# Patient Record
Sex: MALE | Race: WHITE | NOT HISPANIC OR LATINO | Employment: FULL TIME | URBAN - METROPOLITAN AREA
[De-identification: names, ages, dates, MRNs, and addresses within clinical notes are randomized per-mention and may not be internally consistent; named-entity substitution may affect disease eponyms.]

---

## 2023-06-26 ENCOUNTER — OFFICE VISIT (OUTPATIENT)
Dept: PRIMARY CARE | Facility: CLINIC | Age: 53
End: 2023-06-26
Payer: COMMERCIAL

## 2023-06-26 VITALS
DIASTOLIC BLOOD PRESSURE: 78 MMHG | RESPIRATION RATE: 18 BRPM | HEART RATE: 110 BPM | HEIGHT: 70 IN | SYSTOLIC BLOOD PRESSURE: 140 MMHG | OXYGEN SATURATION: 98 % | BODY MASS INDEX: 34.27 KG/M2 | WEIGHT: 239.4 LBS

## 2023-06-26 DIAGNOSIS — Z00.00 HEALTHCARE MAINTENANCE: Primary | ICD-10-CM

## 2023-06-26 DIAGNOSIS — Z12.5 SCREENING FOR PROSTATE CANCER: ICD-10-CM

## 2023-06-26 DIAGNOSIS — J33.9 NASAL POLYPS: ICD-10-CM

## 2023-06-26 DIAGNOSIS — Z13.6 SCREENING FOR CARDIOVASCULAR CONDITION: ICD-10-CM

## 2023-06-26 DIAGNOSIS — J45.20 MILD INTERMITTENT REACTIVE AIRWAY DISEASE WITHOUT COMPLICATION (HHS-HCC): ICD-10-CM

## 2023-06-26 PROBLEM — R73.9 ELEVATED BLOOD SUGAR: Status: RESOLVED | Noted: 2023-06-26 | Resolved: 2023-06-26

## 2023-06-26 PROBLEM — N52.9 MALE ERECTILE DISORDER: Status: RESOLVED | Noted: 2023-06-26 | Resolved: 2023-06-26

## 2023-06-26 PROBLEM — H10.45 CHRONIC ALLERGIC CONJUNCTIVITIS: Status: RESOLVED | Noted: 2023-06-26 | Resolved: 2023-06-26

## 2023-06-26 PROBLEM — N52.9 MALE ERECTILE DISORDER: Status: ACTIVE | Noted: 2023-06-26

## 2023-06-26 PROBLEM — L03.115 CELLULITIS OF RIGHT LOWER EXTREMITY: Status: RESOLVED | Noted: 2023-06-26 | Resolved: 2023-06-26

## 2023-06-26 PROBLEM — J45.909 REACTIVE AIRWAY DISEASE (HHS-HCC): Status: ACTIVE | Noted: 2023-06-26

## 2023-06-26 PROBLEM — M77.10 TENNIS ELBOW: Status: RESOLVED | Noted: 2023-06-26 | Resolved: 2023-06-26

## 2023-06-26 PROBLEM — H91.90 DIMINISHED HEARING: Status: RESOLVED | Noted: 2023-06-26 | Resolved: 2023-06-26

## 2023-06-26 PROBLEM — M75.51 BURSITIS OF RIGHT SHOULDER: Status: RESOLVED | Noted: 2023-06-26 | Resolved: 2023-06-26

## 2023-06-26 PROBLEM — R06.2 WHEEZING: Status: RESOLVED | Noted: 2023-06-26 | Resolved: 2023-06-26

## 2023-06-26 PROBLEM — N35.919 STRICTURE OF MALE URETHRA: Status: RESOLVED | Noted: 2023-06-26 | Resolved: 2023-06-26

## 2023-06-26 PROBLEM — E55.9 VITAMIN D INSUFFICIENCY: Status: RESOLVED | Noted: 2023-06-26 | Resolved: 2023-06-26

## 2023-06-26 PROBLEM — L98.9 SKIN LESION OF BACK: Status: RESOLVED | Noted: 2023-06-26 | Resolved: 2023-06-26

## 2023-06-26 PROBLEM — R39.9 UTI SYMPTOMS: Status: RESOLVED | Noted: 2023-06-26 | Resolved: 2023-06-26

## 2023-06-26 PROBLEM — J06.9 URI, ACUTE: Status: RESOLVED | Noted: 2023-06-26 | Resolved: 2023-06-26

## 2023-06-26 PROBLEM — L25.5 DERMATITIS DUE TO PLANTS, INCLUDING POISON IVY, SUMAC, AND OAK: Status: RESOLVED | Noted: 2023-06-26 | Resolved: 2023-06-26

## 2023-06-26 PROBLEM — L84 CORN OF FOOT: Status: RESOLVED | Noted: 2023-06-26 | Resolved: 2023-06-26

## 2023-06-26 PROBLEM — K64.9 HEMORRHOIDS: Status: RESOLVED | Noted: 2023-06-26 | Resolved: 2023-06-26

## 2023-06-26 PROBLEM — J30.9 ALLERGIC RHINITIS: Status: ACTIVE | Noted: 2023-06-26

## 2023-06-26 PROCEDURE — 99396 PREV VISIT EST AGE 40-64: CPT | Performed by: NURSE PRACTITIONER

## 2023-06-26 RX ORDER — BUDESONIDE AND FORMOTEROL FUMARATE DIHYDRATE 80; 4.5 UG/1; UG/1
2 AEROSOL RESPIRATORY (INHALATION) 2 TIMES DAILY
Qty: 10.2 G | Refills: 11 | Status: SHIPPED | OUTPATIENT
Start: 2023-06-26 | End: 2023-06-29 | Stop reason: SDUPTHER

## 2023-06-26 RX ORDER — AZELASTINE 1 MG/ML
1 SPRAY, METERED NASAL 2 TIMES DAILY
Qty: 30 ML | Refills: 12 | Status: SHIPPED | OUTPATIENT
Start: 2023-06-26 | End: 2023-06-29 | Stop reason: SDUPTHER

## 2023-06-26 RX ORDER — ALBUTEROL SULFATE 90 UG/1
2 AEROSOL, METERED RESPIRATORY (INHALATION) EVERY 4 HOURS PRN
COMMUNITY
Start: 2014-08-21 | End: 2023-06-26 | Stop reason: SDUPTHER

## 2023-06-26 RX ORDER — BUDESONIDE AND FORMOTEROL FUMARATE DIHYDRATE 80; 4.5 UG/1; UG/1
2 AEROSOL RESPIRATORY (INHALATION) 2 TIMES DAILY
COMMUNITY
Start: 2014-12-23 | End: 2023-06-26 | Stop reason: SDUPTHER

## 2023-06-26 RX ORDER — ALBUTEROL SULFATE 90 UG/1
2 AEROSOL, METERED RESPIRATORY (INHALATION) EVERY 4 HOURS PRN
Qty: 18 G | Refills: 3 | Status: SHIPPED | OUTPATIENT
Start: 2023-06-26 | End: 2023-06-29 | Stop reason: SDUPTHER

## 2023-06-26 NOTE — PATIENT INSTRUCTIONS
Have fasting lab work done   Follow up with ENT as you do have polyps in your nose  I sent in a special nasal spray to try.    I sent in your asthma meds  Colonoscopy is not due until 2029    Follow up in 3 months for a blood pressure check

## 2023-06-26 NOTE — PROGRESS NOTES
"Subjective   Patient ID: Son Rouse is a 52 y.o. male who presents for Annual Exam (NP.OV. here today to [facility], denies CPE within the last year. He would like to discus nasal congestion for the past 6 months and he would also like to discuss prescription refills. ).    Well Adult Physical   Patient here for a comprehensive physical exam.The patient reports no problems    Do you take any herbs or supplements that were not prescribed by a doctor? no   Are you taking calcium supplements? no   Are you taking aspirin daily? no    Last PCP left so needed new PCP  Needs refills  Asthma - stable with Symbicort.  Uses rescue inhaler less than 1 time per month     Has had nasal congestion 6 months - has tried many OTC medications, nasal spray and neti pot - nothing has helped   He is     Colonoscopy 12/5/19  Due for lab work  He does chew tobacco    He is a manager and  for an engineering            Review of Systems   All other systems reviewed and are negative.      Objective   /78   Pulse 110   Resp 18   Ht 1.778 m (5' 10\")   Wt 109 kg (239 lb 6.4 oz)   SpO2 98%   BMI 34.35 kg/m²     Physical Exam  Vitals and nursing note reviewed.   Constitutional:       General: He is not in acute distress.     Appearance: Normal appearance.   HENT:      Head: Normocephalic and atraumatic.      Right Ear: Tympanic membrane, ear canal and external ear normal.      Left Ear: Tympanic membrane, ear canal and external ear normal.      Nose: Nose normal.      Right Nostril: Occlusion present.      Left Nostril: Occlusion present.      Right Turbinates: Enlarged.      Left Turbinates: Enlarged.      Mouth/Throat:      Mouth: Mucous membranes are moist.      Pharynx: Oropharynx is clear.   Eyes:      Conjunctiva/sclera: Conjunctivae normal.      Pupils: Pupils are equal, round, and reactive to light.   Neck:      Vascular: No carotid bruit.   Cardiovascular:      Rate and Rhythm: Normal rate and regular " rhythm.      Pulses: Normal pulses.      Heart sounds: Normal heart sounds.   Pulmonary:      Effort: Pulmonary effort is normal.      Breath sounds: Normal breath sounds.   Abdominal:      General: Bowel sounds are normal.      Palpations: Abdomen is soft.   Musculoskeletal:         General: Normal range of motion.      Cervical back: Normal range of motion.      Right lower leg: No edema.      Left lower leg: No edema.   Lymphadenopathy:      Cervical: No cervical adenopathy.   Skin:     General: Skin is warm and dry.      Capillary Refill: Capillary refill takes less than 2 seconds.   Neurological:      General: No focal deficit present.      Mental Status: He is alert and oriented to person, place, and time. Mental status is at baseline.   Psychiatric:         Mood and Affect: Mood normal.         Thought Content: Thought content normal.         Assessment/Plan   Problem List Items Addressed This Visit       Reactive airway disease     Stable  Refilled albuterol  Continue Symbicort         Nasal polyps     Large nasal polyps seen on physical exam  ENT referral  Start astelin         Relevant Orders    Referral to ENT    Healthcare maintenance - Primary     - CBC, CMP, Lipid  -  up to date with immunizations  -  screening colonoscopy 2019  -  eat a diet high in lean protein, vegetable, fruits, and low in carbohydrates  -  exercise 20-30 minutes 4-5 days a week  -  Follow up in 1 year         Relevant Orders    CBC    Comprehensive Metabolic Panel     Other Visit Diagnoses       Screening for cardiovascular condition        Relevant Orders    Lipid Panel    Screening for prostate cancer        Relevant Orders    Prostate Specific Antigen, Screen

## 2023-06-29 DIAGNOSIS — J33.9 NASAL POLYPS: ICD-10-CM

## 2023-06-29 DIAGNOSIS — J45.20 MILD INTERMITTENT REACTIVE AIRWAY DISEASE WITHOUT COMPLICATION (HHS-HCC): ICD-10-CM

## 2023-06-29 RX ORDER — AZELASTINE 1 MG/ML
1 SPRAY, METERED NASAL 2 TIMES DAILY
Qty: 30 ML | Refills: 12 | Status: SHIPPED | OUTPATIENT
Start: 2023-06-29 | End: 2023-10-25 | Stop reason: ALTCHOICE

## 2023-06-29 RX ORDER — ALBUTEROL SULFATE 90 UG/1
2 AEROSOL, METERED RESPIRATORY (INHALATION) EVERY 4 HOURS PRN
Qty: 18 G | Refills: 3 | Status: SHIPPED | OUTPATIENT
Start: 2023-06-29

## 2023-06-29 RX ORDER — BUDESONIDE AND FORMOTEROL FUMARATE DIHYDRATE 80; 4.5 UG/1; UG/1
2 AEROSOL RESPIRATORY (INHALATION) 2 TIMES DAILY
Qty: 10.2 G | Refills: 11 | Status: SHIPPED | OUTPATIENT
Start: 2023-06-29 | End: 2024-06-28

## 2023-07-05 PROBLEM — J33.9 NASAL POLYPS: Status: ACTIVE | Noted: 2023-07-05

## 2023-07-05 PROBLEM — Z00.00 HEALTHCARE MAINTENANCE: Status: ACTIVE | Noted: 2023-07-05

## 2023-07-05 NOTE — ASSESSMENT & PLAN NOTE
- CBC, CMP, Lipid  -  up to date with immunizations  -  screening colonoscopy 2019  -  eat a diet high in lean protein, vegetable, fruits, and low in carbohydrates  -  exercise 20-30 minutes 4-5 days a week  -  Follow up in 1 year

## 2023-08-14 ENCOUNTER — OFFICE VISIT (OUTPATIENT)
Dept: PRIMARY CARE | Facility: CLINIC | Age: 53
End: 2023-08-14
Payer: COMMERCIAL

## 2023-08-14 ENCOUNTER — LAB (OUTPATIENT)
Dept: LAB | Facility: LAB | Age: 53
End: 2023-08-14
Payer: COMMERCIAL

## 2023-08-14 VITALS
SYSTOLIC BLOOD PRESSURE: 136 MMHG | OXYGEN SATURATION: 99 % | RESPIRATION RATE: 18 BRPM | BODY MASS INDEX: 34.04 KG/M2 | DIASTOLIC BLOOD PRESSURE: 74 MMHG | HEIGHT: 70 IN | HEART RATE: 70 BPM | WEIGHT: 237.8 LBS

## 2023-08-14 DIAGNOSIS — R21 RASH: ICD-10-CM

## 2023-08-14 DIAGNOSIS — R21 RASH: Primary | ICD-10-CM

## 2023-08-14 DIAGNOSIS — Z13.6 SCREENING FOR CARDIOVASCULAR CONDITION: ICD-10-CM

## 2023-08-14 DIAGNOSIS — Z00.00 HEALTHCARE MAINTENANCE: ICD-10-CM

## 2023-08-14 DIAGNOSIS — Z12.5 SCREENING FOR PROSTATE CANCER: ICD-10-CM

## 2023-08-14 LAB
ALANINE AMINOTRANSFERASE (SGPT) (U/L) IN SER/PLAS: 35 U/L (ref 10–52)
ALBUMIN (G/DL) IN SER/PLAS: 4.6 G/DL (ref 3.4–5)
ALKALINE PHOSPHATASE (U/L) IN SER/PLAS: 80 U/L (ref 33–120)
ANION GAP IN SER/PLAS: 15 MMOL/L (ref 10–20)
ASPARTATE AMINOTRANSFERASE (SGOT) (U/L) IN SER/PLAS: 24 U/L (ref 9–39)
BILIRUBIN TOTAL (MG/DL) IN SER/PLAS: 0.6 MG/DL (ref 0–1.2)
CALCIUM (MG/DL) IN SER/PLAS: 10 MG/DL (ref 8.6–10.3)
CARBON DIOXIDE, TOTAL (MMOL/L) IN SER/PLAS: 30 MMOL/L (ref 21–32)
CHLORIDE (MMOL/L) IN SER/PLAS: 101 MMOL/L (ref 98–107)
CHOLESTEROL (MG/DL) IN SER/PLAS: 211 MG/DL (ref 0–199)
CHOLESTEROL IN HDL (MG/DL) IN SER/PLAS: 63.5 MG/DL
CHOLESTEROL/HDL RATIO: 3.3
CREATININE (MG/DL) IN SER/PLAS: 0.87 MG/DL (ref 0.5–1.3)
ERYTHROCYTE DISTRIBUTION WIDTH (RATIO) BY AUTOMATED COUNT: 13.2 % (ref 11.5–14.5)
ERYTHROCYTE MEAN CORPUSCULAR HEMOGLOBIN CONCENTRATION (G/DL) BY AUTOMATED: 33.7 G/DL (ref 32–36)
ERYTHROCYTE MEAN CORPUSCULAR VOLUME (FL) BY AUTOMATED COUNT: 91 FL (ref 80–100)
ERYTHROCYTES (10*6/UL) IN BLOOD BY AUTOMATED COUNT: 5.23 X10E12/L (ref 4.5–5.9)
GFR MALE: >90 ML/MIN/1.73M2
GLUCOSE (MG/DL) IN SER/PLAS: 101 MG/DL (ref 74–99)
HEMATOCRIT (%) IN BLOOD BY AUTOMATED COUNT: 47.5 % (ref 41–52)
HEMOGLOBIN (G/DL) IN BLOOD: 16 G/DL (ref 13.5–17.5)
LDL: 133 MG/DL (ref 0–99)
LEUKOCYTES (10*3/UL) IN BLOOD BY AUTOMATED COUNT: 6.4 X10E9/L (ref 4.4–11.3)
PLATELETS (10*3/UL) IN BLOOD AUTOMATED COUNT: 338 X10E9/L (ref 150–450)
POTASSIUM (MMOL/L) IN SER/PLAS: 4.3 MMOL/L (ref 3.5–5.3)
PROSTATE SPECIFIC ANTIGEN,SCREEN: 0.39 NG/ML (ref 0–4)
PROTEIN TOTAL: 7.9 G/DL (ref 6.4–8.2)
SODIUM (MMOL/L) IN SER/PLAS: 142 MMOL/L (ref 136–145)
TRIGLYCERIDE (MG/DL) IN SER/PLAS: 74 MG/DL (ref 0–149)
UREA NITROGEN (MG/DL) IN SER/PLAS: 11 MG/DL (ref 6–23)
VLDL: 15 MG/DL (ref 0–40)

## 2023-08-14 PROCEDURE — 99213 OFFICE O/P EST LOW 20 MIN: CPT | Performed by: STUDENT IN AN ORGANIZED HEALTH CARE EDUCATION/TRAINING PROGRAM

## 2023-08-14 PROCEDURE — 36415 COLL VENOUS BLD VENIPUNCTURE: CPT

## 2023-08-14 PROCEDURE — 84153 ASSAY OF PSA TOTAL: CPT

## 2023-08-14 PROCEDURE — 85027 COMPLETE CBC AUTOMATED: CPT

## 2023-08-14 PROCEDURE — 80053 COMPREHEN METABOLIC PANEL: CPT

## 2023-08-14 PROCEDURE — 87476 LYME DIS DNA AMP PROBE: CPT

## 2023-08-14 PROCEDURE — 80061 LIPID PANEL: CPT

## 2023-08-14 RX ORDER — DOXYCYCLINE HYCLATE 100 MG
TABLET ORAL
COMMUNITY
Start: 2023-08-11 | End: 2023-10-25 | Stop reason: ALTCHOICE

## 2023-08-14 RX ORDER — MULTIVITAMIN
1 TABLET ORAL DAILY
COMMUNITY
End: 2023-10-25 | Stop reason: ALTCHOICE

## 2023-08-14 ASSESSMENT — PATIENT HEALTH QUESTIONNAIRE - PHQ9
1. LITTLE INTEREST OR PLEASURE IN DOING THINGS: NOT AT ALL
2. FEELING DOWN, DEPRESSED OR HOPELESS: NOT AT ALL
SUM OF ALL RESPONSES TO PHQ9 QUESTIONS 1 AND 2: 0

## 2023-08-14 NOTE — PROGRESS NOTES
Subjective   Patient ID: Son Rouse is a 52 y.o. male who presents for Rash (Pt is here for a rash on the  top right shoulder for the last several weeks. Pt has been to  twice, including this past Friday. Pt was given topical for ring work and second visit was given doxycycline for lyme disease.).    Patient presents today for evaluation of his rash.  Patient states that he initially had a rash for couple weeks but it was getting worse.  It started as a single induration on his mid upper back.  It began to get hot and warm and spread erythema throughout the top upper back and shoulders.  He presented to the urgent care and was given a cream to put on it.  This helped initially but then worsened course.  He presented again to the urgent care on the 8/11/23.  He was given a course of doxycycline for 15 days.  Today he feels that the itching and warmth has subsided but is unable to see his back this morning as far as the erythema.  There was some initial concern for possible tick bite due to the initial rash.  Patient will like to be tested today for Lyme.     Rash        Review of Systems   Skin:  Positive for rash.       Objective   Physical Exam  Vitals reviewed.   Skin:            Comments: Mild erythema no warmth or induration         Assessment/Plan   Diagnoses and all orders for this visit:  Rash  Comments:  Improving based off of patient history  Continue doxycycline course  We will order Lyme  Return to care if symptoms worsen  Orders:  -     Lyme Disease (Borrelia burgdorferi), PCR; Future

## 2023-08-19 LAB — LYME DISEASE (BORRELIA BURGDORFERI), PCR: NOT DETECTED

## 2023-09-26 ENCOUNTER — OFFICE VISIT (OUTPATIENT)
Dept: PRIMARY CARE | Facility: CLINIC | Age: 53
End: 2023-09-26
Payer: COMMERCIAL

## 2023-09-26 VITALS
OXYGEN SATURATION: 98 % | HEART RATE: 95 BPM | RESPIRATION RATE: 18 BRPM | SYSTOLIC BLOOD PRESSURE: 142 MMHG | WEIGHT: 242.4 LBS | BODY MASS INDEX: 34.7 KG/M2 | DIASTOLIC BLOOD PRESSURE: 90 MMHG | HEIGHT: 70 IN

## 2023-09-26 DIAGNOSIS — R03.0 ELEVATED BP WITHOUT DIAGNOSIS OF HYPERTENSION: Primary | ICD-10-CM

## 2023-09-26 PROCEDURE — 99213 OFFICE O/P EST LOW 20 MIN: CPT | Performed by: NURSE PRACTITIONER

## 2023-09-26 RX ORDER — PREDNISONE 10 MG/1
10 TABLET ORAL DAILY
COMMUNITY
Start: 2023-09-20 | End: 2023-10-25 | Stop reason: ALTCHOICE

## 2023-09-26 RX ORDER — FLUTICASONE PROPIONATE 50 MCG
2 SPRAY, SUSPENSION (ML) NASAL DAILY
COMMUNITY
Start: 2023-09-20

## 2023-10-24 PROBLEM — L91.8 OTHER HYPERTROPHIC DISORDERS OF THE SKIN: Status: ACTIVE | Noted: 2019-05-24

## 2023-10-24 PROBLEM — D18.01 HEMANGIOMA OF SKIN AND SUBCUTANEOUS TISSUE: Status: ACTIVE | Noted: 2019-05-24

## 2023-10-24 PROBLEM — J32.9 CHRONIC SINUSITIS: Status: ACTIVE | Noted: 2023-10-24

## 2023-10-24 PROBLEM — D22.60 MELANOCYTIC NEVI OF UNSPECIFIED UPPER LIMB, INCLUDING SHOULDER: Status: ACTIVE | Noted: 2019-05-24

## 2023-10-24 PROBLEM — L81.4 OTHER MELANIN HYPERPIGMENTATION: Status: ACTIVE | Noted: 2019-05-24

## 2023-10-24 PROBLEM — L82.1 OTHER SEBORRHEIC KERATOSIS: Status: ACTIVE | Noted: 2019-05-24

## 2023-10-24 PROBLEM — D23.9 OTHER BENIGN NEOPLASM OF SKIN, UNSPECIFIED: Status: ACTIVE | Noted: 2019-05-24

## 2023-10-24 PROBLEM — D22.5 MELANOCYTIC NEVI OF TRUNK: Status: ACTIVE | Noted: 2019-05-24

## 2023-10-24 RX ORDER — BUDESONIDE AND FORMOTEROL FUMARATE DIHYDRATE 160; 4.5 UG/1; UG/1
2 AEROSOL RESPIRATORY (INHALATION) 2 TIMES DAILY
COMMUNITY
End: 2024-01-03 | Stop reason: WASHOUT

## 2023-10-25 ENCOUNTER — OFFICE VISIT (OUTPATIENT)
Dept: OTOLARYNGOLOGY | Facility: CLINIC | Age: 53
End: 2023-10-25
Payer: COMMERCIAL

## 2023-10-25 VITALS — WEIGHT: 243 LBS | TEMPERATURE: 97.5 F | HEIGHT: 69 IN | BODY MASS INDEX: 35.99 KG/M2

## 2023-10-25 DIAGNOSIS — J32.9 CHRONIC SINUSITIS, UNSPECIFIED LOCATION: ICD-10-CM

## 2023-10-25 DIAGNOSIS — J33.9 NASAL POLYP: Primary | ICD-10-CM

## 2023-10-25 PROCEDURE — 99213 OFFICE O/P EST LOW 20 MIN: CPT | Performed by: OTOLARYNGOLOGY

## 2023-10-25 NOTE — PROGRESS NOTES
"HPI  Son Rouse is a 53 y.o. male follow-up on medical treatment of chronic sinusitis with bilateral obstructive nasal polyps.  The prednisone was quite beneficial and he is breathing through his nose bilaterally now.  No facial pain      Past Medical History:   Diagnosis Date    Bursitis of right shoulder 06/26/2023    Cellulitis of right lower extremity 06/26/2023    Chronic allergic conjunctivitis 06/26/2023    Corn of foot 06/26/2023    Elevated blood sugar 06/26/2023    Encounter for immunization 12/23/2014    Need for pneumococcal vaccination    Hemorrhoids 06/26/2023    Impacted cerumen, left ear 08/21/2014    Impacted cerumen of left ear    Male erectile disorder 06/26/2023    Skin lesion of back 06/26/2023    Stricture of male urethra 06/26/2023    URI, acute 06/26/2023    UTI symptoms 06/26/2023    Vitamin D insufficiency 06/26/2023    Wheezing 06/26/2023            Medications:     Current Outpatient Medications:     albuterol (ProAir HFA) 90 mcg/actuation inhaler, Inhale 2 puffs every 4 hours if needed for shortness of breath or wheezing., Disp: 18 g, Rfl: 3    budesonide-formoteroL (Symbicort) 160-4.5 mcg/actuation inhaler, Inhale 2 puffs 2 times a day. Rinse mouth with water after use to reduce aftertaste and incidence of candidiasis. Do not swallow., Disp: , Rfl:     budesonide-formoteroL (Symbicort) 80-4.5 mcg/actuation inhaler, Inhale 2 puffs 2 times a day., Disp: 10.2 g, Rfl: 11    cholecalciferol, vitamin D3, (VITAMIN D3 ORAL), Take by mouth., Disp: , Rfl:     fluticasone (Flonase) 50 mcg/actuation nasal spray, Administer 2 sprays into each nostril once daily., Disp: , Rfl:      Allergies:  Allergies   Allergen Reactions    Nsaids (Non-Steroidal Anti-Inflammatory Drug) Shortness of breath and Wheezing        Physical Exam:  Last Recorded Vitals  Temperature 36.4 °C (97.5 °F), height 1.753 m (5' 9\"), weight 110 kg (243 lb).  General:     General appearance: Well-developed, well-nourished in " no acute distress.       Voice:  normal       Head/face: Normal appearance; nontender to palpation     Facial nerve function: Normal and symmetric bilaterally.    Oral/oropharynx:     Oral vestibule: Normal labial and gingival mucosa     Tongue/floor of mouth: Normal without lesion     Oropharynx: Clear.  No lesions present of the hard/soft palate, posterior pharynx    Neck:     Neck: Normal appearance, trachea midline     Salivary glands: Normal to palpation bilaterally     Lymph nodes: No cervical lymphadenopathy to palpation     Thyroid: No thyromegaly.  No palpable nodules     Range of motion: Normal    Neurological:     Cortical functions: Alert and oriented x3, appropriate affect       Larynx/hypopharynx:     Laryngeal findings: Mirror exam inadequate or limited secondary to enlarged base of tongue and/or excessive gagging    Ear:     Ear canal: Normal bilaterally     Tympanic membrane: Intact and mobile bilaterally     Pinna: Normal bilaterally     Hearing:  Gross hearing assessment normal by voice    Nose:     Visualized using: Anterior rhinoscopy     Nasopharynx: Inadequate mirror exam secondary to gag, anatomy.       Nasal dorsum: Nontraumatic midline appearance     Septum: Deviation   inferior turbinates: Normally sized     Mucosa: Bilateral, nasal polyps extending to the inferior turbinate bilaterally but airway is improved bilaterally compared to a month ago      Assessment/Plan   He has chronic sinusitis and nasal polyps findings with history suggestive of Samter's triad.  I recommended a CT scan of the sinuses in light of his continued considerable disease even though his symptoms are improved.  He will call when this is done for review and we did discuss consideration of surgical options going forward         Alexandro Nicolas MD

## 2023-11-15 ENCOUNTER — HOSPITAL ENCOUNTER (OUTPATIENT)
Dept: RADIOLOGY | Facility: HOSPITAL | Age: 53
Discharge: HOME | End: 2023-11-15
Payer: COMMERCIAL

## 2023-11-15 DIAGNOSIS — J32.9 CHRONIC SINUSITIS, UNSPECIFIED LOCATION: ICD-10-CM

## 2023-11-15 PROCEDURE — 70486 CT MAXILLOFACIAL W/O DYE: CPT

## 2023-11-27 ENCOUNTER — PHARMACY VISIT (OUTPATIENT)
Dept: PHARMACY | Facility: CLINIC | Age: 53
End: 2023-11-27
Payer: COMMERCIAL

## 2023-11-27 PROCEDURE — RXMED WILLOW AMBULATORY MEDICATION CHARGE

## 2023-12-14 ENCOUNTER — TELEPHONE (OUTPATIENT)
Dept: OTOLARYNGOLOGY | Facility: CLINIC | Age: 53
End: 2023-12-14
Payer: COMMERCIAL

## 2023-12-14 NOTE — TELEPHONE ENCOUNTER
RN left voicemail for patient x2 to have him schedule NPV with Dr. Eden, referral from Dr. Nicolas. Patient encouraged to contact office to make appointment, phone number provided in voicemail each time.

## 2023-12-15 ENCOUNTER — APPOINTMENT (OUTPATIENT)
Dept: OTOLARYNGOLOGY | Facility: CLINIC | Age: 53
End: 2023-12-15
Payer: COMMERCIAL

## 2024-01-03 ENCOUNTER — OFFICE VISIT (OUTPATIENT)
Dept: PRIMARY CARE | Facility: CLINIC | Age: 54
End: 2024-01-03
Payer: COMMERCIAL

## 2024-01-03 VITALS
WEIGHT: 252.2 LBS | DIASTOLIC BLOOD PRESSURE: 72 MMHG | BODY MASS INDEX: 36.11 KG/M2 | HEIGHT: 70 IN | SYSTOLIC BLOOD PRESSURE: 142 MMHG | HEART RATE: 76 BPM | RESPIRATION RATE: 16 BRPM | OXYGEN SATURATION: 96 %

## 2024-01-03 DIAGNOSIS — J45.20 MILD INTERMITTENT REACTIVE AIRWAY DISEASE WITHOUT COMPLICATION (HHS-HCC): Primary | ICD-10-CM

## 2024-01-03 DIAGNOSIS — R03.0 ELEVATED BP WITHOUT DIAGNOSIS OF HYPERTENSION: ICD-10-CM

## 2024-01-03 DIAGNOSIS — J33.9 NASAL POLYPS: ICD-10-CM

## 2024-01-03 PROCEDURE — 99214 OFFICE O/P EST MOD 30 MIN: CPT | Performed by: STUDENT IN AN ORGANIZED HEALTH CARE EDUCATION/TRAINING PROGRAM

## 2024-01-03 ASSESSMENT — PATIENT HEALTH QUESTIONNAIRE - PHQ9
SUM OF ALL RESPONSES TO PHQ9 QUESTIONS 1 AND 2: 0
2. FEELING DOWN, DEPRESSED OR HOPELESS: NOT AT ALL
1. LITTLE INTEREST OR PLEASURE IN DOING THINGS: NOT AT ALL

## 2024-01-03 NOTE — PATIENT INSTRUCTIONS
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 You are a good candidate for endoscopic sinus surgery, septoplasty, and inferior turbinate reductions. After your sinus surgery, you will need to do frequent saline irrigations. This will extremely important so that your sinuses stay open after surgery. Please review the instructions below to prepare for surgery.    Approximately 10% of people with chronic sinusitis find it helpful to avoid consuming anything that contains salicylates (such as red wine, red meat, aspirin, NSAIDs [Advil, Motrin, Aleve]). A gluten-free or vegan diet can also be beneficial.    PATIENT INFORMATION/INSTRUCTIONS PRIOR TO SINUS SURGERY: *Please Read Carefully*    Dr. Eden discussed the rationale for endoscopic sinus surgery, along with the benefits, risks, potential complications, and side effects of the procedure with you today. If you have any questions about these subjects, please feel free to call our office at [phone].    You can expect after surgery to have increased symptoms and congestion for at least 1 week and sometimes upward of a month. Everyone's body reacts differently. You will have at least 2 visits with Dr. Eden for cleaning/debridement of the sinuses in the office after surgery, approximately 1 week and 1 month after surgery. This is necessary to ensure the sinuses heal well. Dr. Eden works closely with Kayli Mancini, his nurse practitioner. You will follow up with Dr. Eden for the first 3 months after your surgery. After this, Dr. Eden and Kayli Mancini will work as a team to take care of you in the months or years after.    As described, your sinus surgery is only half the shelton. Post operatively, it is VITAL that you continue the nasal irrigations and other medications directed by Dr. Eden. You will be given an instructional sheet post operatively that describes the expected  disease course including nasal care. Dr. Eden will want you to start irrigations of the nose with saline post operatively. This is also important to keep the nose and sinuses open.    STOP TAKING THESE MEDICATIONS prior to surgery:  Aspirin - 10 to 14 days before surgery  Plavix/clopidogrel - 10 days before surgery  NSAIDs - 7 days before surgery (NSAIDs include painkillers like Motrin, Aleve, Naprosyn, Mobic, diclofenac, and ibuprofen)  Vitamin E - 10 to 14 days before surgery  Multivitamins with Vitamin E - 10 to 14 days before surgery  Herbal Medications/Diet Pills - 10 to 14 days before surgery    5.   Avoid NSAIDs for PAIN RELIEF. If needed, you could take Tylenol on the day of surgery with sips of water. You may also use opiates prescribed by your physician which includes medications like Percocet / Vicodin / MS Contin / Darvocet / Ultram.    6.   BLOOD THINNERS including Coumadin, Plavix, and Ticlid are to be stopped as directed by surgeon/cardiologist or as listed below.    7.   FOR PATIENTS WITH ASTHMA/COPD:  Use your inhalers as prescribed/as needed on the day of surgery. Bring your inhalers with you to the hospital. If you have Obstructive Sleep Apnea and are on a CPAP/BiPAP machine, please bring it with you to the hospital.    8.   On the day of surgery, DO NOT wear jewelry, body piercings, makeup, nail polish, hairpins, or contacts. Leave all valuables and money with family members. If you have dentures, please leave these with family members.    9.   IF you are undergoing an OUTPATIENT procedure (Ambulatory Surgery - going home on the same day), you must have someone drive you home and stay with you for 24 hours after surgery. You cannot stay alone in a hotel room after outpatient surgery. Also, a  or  cannot be made a responsible caregiver. Your surgery may be cancelled if you do not have someone take care of you for 24 hours.    10.  You will be given a time to arrive the  business day before surgery. If you do not hear about a time by 4:30 pm the business day before surgery, please call [phone] and ask for a time.    AFTER SURGERY, please observe the following precautions for 2 weeks from the date of surgery:  Please refrain from blowing your nose. Do not stifle any sneezes. If you must sneeze, try to sneeze through an open mouth. Please do not stick anything in your nose other than any medicine or irrigations we recommend. Please do not insert a Q-tip or finger in the nose because this will cause further trauma. Please refrain from any activities that will increase your heart rate or blood pressure. Try to keep your head above your heart as much as possible. Please refrain from lifting objects greater than 10 lbs. You would also need to avoid all travel outside your local area for 2 weeks from date of surgery.    Scribe Attestation  By signing my name below, I, Lopez Valencia, attest that this documentation has been prepared under the direction and in the presence of Donnie Eden MD. All medical record entries made by the Scribe were at my direction or personally dictated by me. I have reviewed the chart and agree that the record accurately reflects my personal performance of the history, physical exam, discussion and plan.

## 2024-01-03 NOTE — PROGRESS NOTES
History Of Present Illness  Son Rouse is a 53 y.o. male presents for establishment.     BP  Has been attempting lifestyle changes for BP.   Has been noted previously   Very stressful work environment   CT calcium score 2019: 27 low risk     Reactive Airway Disease   Symbicort 1 puff daily   Night time none  Albuterol PRN twice a month   Intermittently using flonase     Nasal Polyps   Following with ENT tomorrow     Colonoscopy 2019: 10 year fu   Past Medical History  He has a past medical history of Allergic (4 years ago), Asthma (4 years ago), Bursitis of right shoulder (06/26/2023), Cellulitis of right lower extremity (06/26/2023), Chronic allergic conjunctivitis (06/26/2023), Corn of foot (06/26/2023), Elevated blood sugar (06/26/2023), Encounter for immunization (12/23/2014), Hemorrhoids (06/26/2023), Impacted cerumen, left ear (08/21/2014), Male erectile disorder (06/26/2023), Skin lesion of back (06/26/2023), Stricture of male urethra (06/26/2023), URI, acute (06/26/2023), UTI symptoms (06/26/2023), Vitamin D insufficiency (06/26/2023), and Wheezing (06/26/2023).    Surgical History  He has a past surgical history that includes Other surgical history (08/21/2014).     Social History  He reports that he has quit smoking. His smoking use included cigarettes. His smokeless tobacco use includes chew. He reports current alcohol use of about 8.0 standard drinks of alcohol per week. He reports that he does not use drugs.    Family History  Family History   Problem Relation Name Age of Onset    Other (cardiac disorder) Mother Rosario     Diabetes Mother Rosario     Other (cabg) Father Nas     Stroke Father Nas     Other (cardiac disorder) Father Nas     Hypertension Father Nas     Breast cancer Sister Virginie     Esophageal cancer Sister Virginie     Ovarian cancer Sister Virginie     Osteosarcoma Daughter Corie     Cancer Daughter Corie     Diabetes Maternal Grandmother Monica     Heart  attack Maternal Grandfather      Stroke Paternal Grandfather Rajendra         Allergies  Nsaids (non-steroidal anti-inflammatory drug)    Review of Systems     Physical Exam  Vitals reviewed.   Constitutional:       General: He is not in acute distress.     Appearance: He is not ill-appearing.   HENT:      Right Ear: Tympanic membrane and ear canal normal.      Left Ear: Tympanic membrane and ear canal normal.      Nose:      Comments: Nasal polyps noted      Mouth/Throat:      Mouth: Mucous membranes are moist.      Pharynx: Oropharynx is clear. No oropharyngeal exudate or posterior oropharyngeal erythema.   Eyes:      Extraocular Movements: Extraocular movements intact.      Conjunctiva/sclera: Conjunctivae normal.      Pupils: Pupils are equal, round, and reactive to light.   Neck:      Vascular: No carotid bruit.   Cardiovascular:      Rate and Rhythm: Normal rate and regular rhythm.      Heart sounds: No murmur heard.     No gallop.   Pulmonary:      Effort: Pulmonary effort is normal.      Breath sounds: Normal breath sounds. No wheezing, rhonchi or rales.   Abdominal:      General: Abdomen is flat. Bowel sounds are normal.      Palpations: Abdomen is soft.      Tenderness: There is no abdominal tenderness.   Musculoskeletal:      Cervical back: Neck supple.      Left lower leg: No edema.   Skin:     General: Skin is warm and dry.      Findings: No rash.   Neurological:      General: No focal deficit present.      Mental Status: He is alert and oriented to person, place, and time.      Gait: Gait normal.   Psychiatric:         Mood and Affect: Mood normal.         Behavior: Behavior normal.          Last Recorded Vitals  /72   Pulse 76   Resp 16   Wt 114 kg (252 lb 3.2 oz)   SpO2 96%     Relevant Results    Current Outpatient Medications:     albuterol (ProAir HFA) 90 mcg/actuation inhaler, Inhale 2 puffs every 4 hours if needed for shortness of breath or wheezing., Disp: 18 g, Rfl: 3     budesonide-formoteroL (Symbicort) 80-4.5 mcg/actuation inhaler, Inhale 2 puffs 2 times a day., Disp: 10.2 g, Rfl: 11    cholecalciferol, vitamin D3, (VITAMIN D3 ORAL), Take by mouth., Disp: , Rfl:     fluticasone (Flonase) 50 mcg/actuation nasal spray, Administer 2 sprays into each nostril once daily., Disp: , Rfl:           Assessment/Plan   Son Rouse is a 53 year old male with PMHX of reactive airway disease who presents to the office for establishment of care.    Elevated BP without HTN dx   142/72mmHg  Pt will be working on lifestyle changes will recheck in 2 months   Physical exam was stable. Discussed maintaining diets such as DASH to help maintain normal Blood pressure. Encouraged regular exercise for a total of 120 min weekly. We will fu labs in 1-3 days. For now there will be no change in medical management. All questions and concerns were addressed. Pt will follow up in 4-6 months or sooner if needed.     Nasal Polyps   Will be evaluated by ENT     Reactive Airway Disease   Controlled   Symbicort 1 puff daily   Night time none  Albuterol PRN twice a month   Intermittently using flonase     Health Maintenance  Colonoscopy: 2019 fu 10 years   Vaccines: shingles recommended at any time    FU 6 months for HTN fu          Verenice Cruz DO

## 2024-01-03 NOTE — PROGRESS NOTES
HPI   Son Rouse is a 53 y.o. male, referred by my highly esteemed colleague Dr. Alexandro Nicolas MD. The patient presents with concerns of sinus and nose problems that began at least 1 year ago but worsened approximately 3 months ago. The patient describes his sinus/nose symptoms as #1 nasal obstruction, #2 posterior nasal drainage, #3 anosmia for about 3 months. He was able to walk up to 3 miles/day a couple years ago. This has gradually reduced to 2 miles/day and now to only 1 mile/day as of last year. You endorses yellow nasal drainage in the morning. Patient denies facial pressure, rhinorrhea, facial pain, periorbital edema, epiphora, loss of taste, loss of smell, and epistaxis. The patient has taken fluticasone for greater than 3 months and azelastine to alleviate the problem. He has a history of allergic rhinitis or allergies. In 2018, he tested positive to ragweed, mold spores, oak. He denies any diagnosis of nasal polyps or chronic sinusitis by the allergist in 2018. He is allergic to NSAIDs and has a history of AERD. He takes Symbicort for asthma.    History of prior nasal/sinus surgery or procedure: Denies    Chief Concern:  Nasal Polyps and Sinusitis    Past Medical History  He has a past medical history of Allergic (4 years ago), Asthma (4 years ago), Bursitis of right shoulder (06/26/2023), Cellulitis of right lower extremity (06/26/2023), Chronic allergic conjunctivitis (06/26/2023), Corn of foot (06/26/2023), Elevated blood sugar (06/26/2023), Encounter for immunization (12/23/2014), Hemorrhoids (06/26/2023), Impacted cerumen, left ear (08/21/2014), Male erectile disorder (06/26/2023), Skin lesion of back (06/26/2023), Stricture of male urethra (06/26/2023), URI, acute (06/26/2023), UTI symptoms (06/26/2023), Vitamin D insufficiency (06/26/2023), and Wheezing (06/26/2023).    Surgical History  He has a past surgical history that includes Other surgical history (08/21/2014).    Social History  He  reports that he has quit smoking. His smoking use included cigarettes. He has quit using smokeless tobacco.  His smokeless tobacco use included chew. He reports current alcohol use of about 8.0 standard drinks of alcohol per week. He reports that he does not use drugs.    Family History  Family History   Problem Relation Name Age of Onset    Other (cardiac disorder) Mother Rosario     Diabetes Mother Rosario     Other (cabg) Father Nas     Stroke Father Nas     Other (cardiac disorder) Father Nas     Hypertension Father Nas     Breast cancer Sister Virginie     Esophageal cancer Sister Virginie     Ovarian cancer Sister Virginie     Osteosarcoma Daughter Corie     Cancer Daughter Corie     Diabetes Maternal Grandmother Monica     Heart attack Maternal Grandfather      Stroke Paternal Grandfather Nas        Allergies  Nsaids (non-steroidal anti-inflammatory drug)    Medications  Current Outpatient Medications:     albuterol (ProAir HFA) 90 mcg/actuation inhaler, Inhale 2 puffs every 4 hours if needed for shortness of breath or wheezing., Disp: 18 g, Rfl: 3    budesonide-formoteroL (Symbicort) 80-4.5 mcg/actuation inhaler, Inhale 2 puffs 2 times a day., Disp: 10.2 g, Rfl: 11    cholecalciferol, vitamin D3, (VITAMIN D3 ORAL), Take by mouth., Disp: , Rfl:     fluticasone (Flonase) 50 mcg/actuation nasal spray, Administer 2 sprays into each nostril once daily., Disp: , Rfl:     Review of Systems   Negative for constitutional, eyes, cardiac, pulmonary, hepatic, renal, digestive, hematologic, epileptic, syncopal, musculo-skeletal, mental health, integumentary, hypertensive, lipid, arthritic, diabetic, thyroid or neurologic disorders (except as listed in the HPI, PMH and Problem List).    Assessment   Son Rouse is a 53 y.o. male h/o AERD with symptoms of nasal obstruction, nasal drainage, anosmia, and clinical findings consistent with bilateral chronic rhinosinusitis (CRS) with nasal  polyposis (NP), right nasal septal deviation (DNS), bilateral inferior turbinate hypertrophy (ITH).  1/4/24 - CRS w NP, ITH, right DNS. Patient reports current symptoms including nasal obstruction, nasal drainage, anosmia. He has failed maximal medical therapy including greater than 3 months of topical steroid sprays, and greater than 2 weeks of oral prednisone, and greater than 2 weeks of doxycycyline. He is a surgical candidate as noted below.    Plan   I personally reviewed the notes from the [facility] Dr. Alexandro Nicolas from 10/25/2023. 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 He also has a history of asthma and allergy to NSAIDs.    I personally reviewed the patient's CT scan images and results. I discussed the results personally with the patient. The following findings were discussed: CT Sinus: 11/15/2023: Shows complete bilateral opacification of all sinuses and thickening of all sinuses bilaterally. There is a right septal deviation and bilateral inferior turbinate hypertrophy.    Reassurance and counseling was provided to the patient, and his condition was extensively discussed, including as noted below:    Nasal endoscopy. Findings: as noted.  Patient is a good candidate for endoscopic sinus surgery. Patient has failed maximal medical therapy. Son Rouse and I discussed his symptoms and clinical findings noted above in detail. We discussed options including observation, continued medical therapy, or consideration of surgical intervention. Endoscopic sinus surgery itself was discussed at length. The risks, benefits, complications, and alternatives were explained in detail including but not limited to persistence of symptoms, anesthesia, pain, changes in or loss of smell, nasal obstruction, septal perforation, bleeding, infection, need for nasal packing, double vision, vision loss, CSF leak  requiring other procedures to repair, numbness of teeth/and or face, need for revision surgery, injury to surrounding tissue, and unexpected risks.  Patient is a candidate for bilateral ESS (total), septoplasty, bilateral inferior turbinate reductions, IGS.  The patient expressed understanding of these risks and provided informed consent. An information sheet via the medical record was provided to the patient, which included the rationale for surgery, risks, and expected postoperative care. Patient will be contacted by surgical schedulers.    Patient may continue the steroid nasal spray until surgery.  Patient will follow up in clinic after surgery. WE discussed he will likely need further therapies beyond topical including an evaluation by allergy/immunology and consideration of ASA desensitization, biologics, and or dietary changes in the future along with management of his asthma by other providers. Pt expressed understanding.    Referring Provider: Dr. Alexandro Nicolas I will provide a report to the referring provider via the electronic medical record or US mail.    Donnie Eden MD [facility]  Division of Rhinology, Sinus, and Skull Base Surgery       Exam   General: This is a healthy appearing male who appears his stated age. The patient is alert and appropriately verbally conversant without hoarseness.    Face: The face was inspected and no cutaneous masses or lesions were visualized. There was no erythema or edema noted. Facial movement was symmetric without weakness. No skin lesions were detected. There was no sinus tenderness elicited. The parotid and submandibular glands were normal to palpation.    Eyes: Extra-ocular muscle function was intact. No nystagmus was observed. Pupils were equal.    Cranial Nerves: Cranial nerves II, III, IV, and VI were noted to be intact via extra-ocular muscle movement testing. Cranial nerve VII noted to be intact and symmetric by facial movement. Cranial nerve VIII was tested with  whispered voice examination and revealed symmetric hearing. Cranial nerves IX and X noted to be intact by gag reflex and palatal movement. Cranial nerve XII noted to be intact by active and symmetric tongue movement.    Nose: Examination of the nose revealed the nasal dorsum to be midline. Intranasal exam reveals the septum is deviated to the right. There are polyps that can be seen in the nasal cavity bilaterally at the level of the head of the inferior turbinate. The inferior turbinates were hypertrophic. See below procedure note as applicable for further exam.    Oral Cavity: Examination of the oral cavity revealed no mass lesions nor infection. The palate was noted to be intact without evidence of clefting. The tongue exhibited normal mobility. Mucosa was moist without lesion. The lips were free of lesion. Gums were free of inflammation. Dentition: normal without obvious infection or inflammation.    Oropharynx: The oral pharynx was free of mass lesion or mucosal abnormality. The palate was noted to be without lesion. The uvula was normal appearing. The tonsils were normal appearing.    Ears: Examination of the ears revealed that the auricles were normally formed with no lesions. The external auditory canals were cleaned of any obstructing cerumen. The tympanic membranes were intact and freely mobile to pneumatoscopy. There are no significant retraction pockets. There is no inflammation visualized. No effusions are seen.    Neck: Visualization and palpation of the neck revealed no mass lesions, no thyromegaly or thyroid masses. No skin lesions or inflammatory processes were detected. The cervical musculature was normal to palpation.    Cervical Lymphatics: There were no palpable lymph nodes in the posterior triangle, submandibular triangle, jugulodigastric region, or central neck.    CV: peripheral perfusion intact, no cyanosis, clubbing or edema of extremities.    Respiratory: Normal inspiration and expiration  and chest wall expansion, no use of accessory muscles to breathe, no stridor or stertor.    Procedure Note:  Procedure: Nasal endoscopy - diagnostic  Indication: concern for chronic sinusitis  Informed consent obtained: risks, benefits, alternatives, and expectations discussed with patient and the patient wishes to proceed.    Findings: After topical decongestion with decongestant and anesthetic spray, nasal endoscopy was performed using an endoscope. The septum was deviated to the right and did appear obstructive. The inferior turbinates were hypertrophic. The middle turbinates appeared largely replaced with polyps. There is thick mucous in the middle meatus along with polyps bilaterally. The superior meatus and sphenoethmoid recess are with polyps bilaterally. The nasal passageway is not patent secondary to polypoid disease. The nasopharynx was clear. There were no complications and the patient tolerated the procedure well.       Scribe Attestation  By signing my name below, I, Lopez Valencia, attest that this documentation has been prepared under the direction and in the presence of Donnie Eden MD. All medical record entries made by the Scribe were at my direction or personally dictated by me. I have reviewed the chart and agree that the record accurately reflects my personal performance of the history, physical exam, discussion and plan.

## 2024-01-04 ENCOUNTER — OFFICE VISIT (OUTPATIENT)
Dept: OTOLARYNGOLOGY | Facility: CLINIC | Age: 54
End: 2024-01-04
Payer: COMMERCIAL

## 2024-01-04 VITALS — BODY MASS INDEX: 35.15 KG/M2 | TEMPERATURE: 97.5 F | WEIGHT: 245 LBS

## 2024-01-04 DIAGNOSIS — Z88.6 ASPIRIN-EXACERBATED RESPIRATORY DISEASE (AERD) (HHS-HCC): ICD-10-CM

## 2024-01-04 DIAGNOSIS — J34.89 NASAL OBSTRUCTION: ICD-10-CM

## 2024-01-04 DIAGNOSIS — J33.9 ASPIRIN-EXACERBATED RESPIRATORY DISEASE (AERD) (HHS-HCC): ICD-10-CM

## 2024-01-04 DIAGNOSIS — J32.4 CHRONIC PANSINUSITIS: Primary | ICD-10-CM

## 2024-01-04 DIAGNOSIS — J45.909 ASPIRIN-EXACERBATED RESPIRATORY DISEASE (AERD) (HHS-HCC): ICD-10-CM

## 2024-01-04 DIAGNOSIS — J33.8 POLYP OF NASAL SINUS: ICD-10-CM

## 2024-01-04 DIAGNOSIS — J34.3 HYPERTROPHY OF BOTH INFERIOR NASAL TURBINATES: ICD-10-CM

## 2024-01-04 DIAGNOSIS — J34.2 DEVIATED NASAL SEPTUM: ICD-10-CM

## 2024-01-04 PROCEDURE — 31231 NASAL ENDOSCOPY DX: CPT | Performed by: OTOLARYNGOLOGY

## 2024-01-04 PROCEDURE — 99204 OFFICE O/P NEW MOD 45 MIN: CPT | Performed by: OTOLARYNGOLOGY

## 2024-01-04 ASSESSMENT — PATIENT HEALTH QUESTIONNAIRE - PHQ9: 2. FEELING DOWN, DEPRESSED OR HOPELESS: NOT AT ALL

## 2024-01-05 DIAGNOSIS — J34.2 DEVIATED NASAL SEPTUM: ICD-10-CM

## 2024-01-05 DIAGNOSIS — J32.4 CHRONIC PANSINUSITIS: ICD-10-CM

## 2024-01-05 DIAGNOSIS — J33.8 POLYP OF NASAL SINUS: ICD-10-CM

## 2024-01-05 PROBLEM — J34.3 HYPERTROPHY OF NASAL TURBINATES: Status: ACTIVE | Noted: 2024-01-05

## 2024-01-10 DIAGNOSIS — J34.2 DEVIATED NASAL SEPTUM: ICD-10-CM

## 2024-01-10 DIAGNOSIS — J33.8 OTHER POLYP OF SINUS: ICD-10-CM

## 2024-01-10 DIAGNOSIS — J34.3 HYPERTROPHY OF NASAL TURBINATES: ICD-10-CM

## 2024-01-10 DIAGNOSIS — J32.4 CHRONIC PANSINUSITIS: ICD-10-CM

## 2024-01-18 PROBLEM — J34.89 NASAL OBSTRUCTION: Status: ACTIVE | Noted: 2024-01-05

## 2024-02-08 NOTE — ASSESSMENT & PLAN NOTE
Discussed lifestyle changes to help with BP  Decrease salt intake  Does have stressful job  Follow up in 2 months for recheck

## 2024-02-08 NOTE — PROGRESS NOTES
"Subjective   Patient ID: Son Rouse is a 53 y.o. male who presents for Follow-up (Patient in today for routine F/U, he reports no concerns at this time. ).    Presents to follow up for blood pressure  Saw Dr. Cruz for rash on 8/14/23 and BP elevated 142/90  Denies chest pain, shortness of breath or headache  Following ENT for nasal polyp         Review of Systems   All other systems reviewed and are negative.      Objective   /90   Pulse 95   Resp 18   Ht 1.778 m (5' 10\")   Wt 110 kg (242 lb 6.4 oz)   SpO2 98%   BMI 34.78 kg/m²     Physical Exam  Vitals and nursing note reviewed.   Constitutional:       General: He is not in acute distress.     Appearance: Normal appearance.   HENT:      Head: Normocephalic and atraumatic.      Right Ear: External ear normal.      Left Ear: External ear normal.      Mouth/Throat:      Mouth: Mucous membranes are moist.      Pharynx: Oropharynx is clear.   Eyes:      Extraocular Movements: Extraocular movements intact.      Conjunctiva/sclera: Conjunctivae normal.      Pupils: Pupils are equal, round, and reactive to light.   Neck:      Vascular: No carotid bruit.   Cardiovascular:      Rate and Rhythm: Normal rate and regular rhythm.      Pulses: Normal pulses.      Heart sounds: Normal heart sounds.   Pulmonary:      Effort: Pulmonary effort is normal.      Breath sounds: Normal breath sounds.   Lymphadenopathy:      Cervical: No cervical adenopathy.   Skin:     General: Skin is warm and dry.   Neurological:      Mental Status: He is alert and oriented to person, place, and time. Mental status is at baseline.   Psychiatric:         Mood and Affect: Mood normal.         Behavior: Behavior normal.         Thought Content: Thought content normal.         Judgment: Judgment normal.         Assessment/Plan   Problem List Items Addressed This Visit             ICD-10-CM    Elevated BP without diagnosis of hypertension - Primary R03.0     Discussed lifestyle changes to " help with BP  Decrease salt intake  Does have stressful job  Follow up in 2 months for recheck

## 2024-02-13 RX ORDER — BISMUTH SUBSALICYLATE 262 MG
1 TABLET,CHEWABLE ORAL DAILY
COMMUNITY

## 2024-02-13 NOTE — PREPROCEDURE INSTRUCTIONS
Pre-Op Instructions & Checklist  Your surgery has been scheduled at [facility] at [address] [place], [address], [address], in the [facility]. Parking is to the left of the main entrance.  You will be contacted about the time of your surgery the day before your surgery. If you are unable to answer the phone, a detailed voicemail message will be left. Make sure that your voicemail box is not full so a message can be left. If you have not received a call by 3:00 pm you may call [phone] between the hours of 3:00 and 4:00 pm. Please be available by phone the night before/day of surgery in case there is a change in the schedule which may require you to arrive earlier/later.  14 DAYS BEFORE SURGERY STOP TAKING WEIGHT LOSS MEDICATIONS     7 DAYS BEFORE SURGERY STOP THESE MEDICATIONS:  Multiple Vitamins containing Vitamin E  Herbal supplements, Fish Oil, garlic pills, turmeric, CoQ enzyme  Stop taking aspirin, and aspirin-containing products as well as NSAID's such as Advil, Motrin, Aleve, Ibuprofen. Tylenol is okay to take for pain relief.   If you are currently taking Coumadin/Warfarin, we will have to coordinate that with your PCP &/or the Anticoagulation Clinic.    THE DAY BEFORE SURGERY:  *Do not eat any food after midnight the night before surgery.   *You are permitted to have clear liquids such as water, apple juice, plain tea or coffee (no milk or creamer), clear electrolyte-replenishing drinks such as Pedialyte, Gatorade, or Powerade (not yogurt or pulp-containing smoothies or juices such as orange juice) up to 2 hours before your surgery.    DAY OF SURGERY, TAKE THESE MEDICATIONS with a small sip of water (if it is not listed, do not take it):    Take: Symbicort inhaler; bring your albuterol inhaler to the surgery center.    N THE MORNING OF SURGERY:  *Shower either the night before your surgery or the morning of your surgery  *Do not use moisturizers, creams, lotions or  perfume, or make-up.  *Wear comfortable, loose fitting clothing.   *All jewelry and valuables should be left at home.  *Prosthetic devices such as contact lenses, hearing aids, dentures, eyelash extensions, hairpins and body piercing must be removed before surgery. Bring containers for eyeglasses/contacts, dentures, or hearing aids with you.  Diabetics: Please check fasting blood sugars upon waking up.  If fasting blood sugars are<80ml/dl, please drink 3 ounces of apple juice no later than 2 hours prior to surgery.      BRING WITH YOU:  *Photo ID and insurance card  *Current list of medicines and allergies  *Pacemaker/Defibrillator/Heart stent cards  *Copy of your complete Advanced Directive/DHPOA-if applicable      SMOKING:  *Quitting smoking can make a huge difference to your health and recovery from surgery.    *If you need help with quitting, call [phone]-QUIT-NOW.  Alcohol:  *No alcoholic beverages for 48 hours before surgery.    AFTER OUTPATIENT SURGERY:  *A responsible adult MUST accompany you at the time of discharge and stay with you for 24 hours after your surgery.  *You may NOT drive yourself home after surgery.  *You may use a taxi or ride sharing service ([employer], Uber) to return home ONLY if you are accompanied by a friend or family member.  *Instructions for resuming your medications will be provided by your surgeon.    CONTACT SURGEON'S OFFICE IF YOU DEVELOP:  * Fever =/> 100.4 F   * New respiratory symptoms (e.g. cough, shortness of breath, respiratory distress, sore throat)  * Recent loss of taste or smell  *Flu like symptoms such as headache, fatigue or gastrointestinal symptoms  * If you develop any open sores, shingles, burning or painful urination   AND/OR:  * You no longer wish to have the surgery.  * Any other personal circumstances change that may lead to the need to cancel or defer this surgery.  *You were admitted to any hospital within one week of your planned procedure.      If you have any  questions regarding these preoperative instructions you may call [phone]. If you have questions regarding you surgical procedure, or post-operative care/recovery please call your surgeon's office.     Link to [facility] [email or web address]  [email or web address]

## 2024-02-13 NOTE — CPM/PAT H&P
CPM/PAT Evaluation       Name: Son Rouse (Son Rouse)  /Age: 1970/53 y.o.     TELEMEDICINE ENCOUNTER  Patient was contacted by telephone for preadmission testing perioperative risk assessment prior to surgery.    CHIEF COMPLAINT  Chronic pansinusitis; nasal polyp    HPI  Son Rouse is a 53 year old male complaining of sinus/nasal symptoms that began about a year ago, but have worsened over the past 3 months.  Most bothersome is nasal obstruction, posterior nasal drainage, and loss of sense of smell.  Reports yellow nasal drainage in the morning and has been taking fluticasone for the past 3 months to alleviate the problem but with little improvement of his symptoms.  He states that it is constant regardless of time of day, or year. He has a history of allergic rhinitis, asthma, and is allergic to NSAIDs.  He takes Symbicort for asthma as needed primarily with weather changes.  Patient denies history of prior sinus surgery.  He is scheduled for recommended nasal endoscopy with excision of tissue of ethmoid sinus on 2024.    ACTIVE PROBLEMS  Patient Active Problem List   Diagnosis    Allergic rhinitis    Reactive airway disease    Nasal polyps    Healthcare maintenance    Chronic sinusitis    Hemangioma of skin and subcutaneous tissue    Other benign neoplasm of skin, unspecified    Melanocytic nevi of trunk    Melanocytic nevi of unspecified upper limb, including shoulder    Other melanin hyperpigmentation    Other seborrheic keratosis    Other hypertrophic disorders of the skin    Elevated BP without diagnosis of hypertension    Deviated nasal septum    Polyp of nasal sinus    Hypertrophy of nasal turbinates    Nasal obstruction     PAST MEDICAL HISTORY  Past Medical History:   Diagnosis Date    Allergic 4 years ago    Asthma 4 years ago    Bursitis of right shoulder 2023    Cellulitis of right lower extremity 2023    Chronic allergic conjunctivitis 2023    Corn of  foot 06/26/2023    Elevated blood sugar 06/26/2023    Encounter for immunization 12/23/2014    Need for pneumococcal vaccination    Hemorrhoids 06/26/2023    Impacted cerumen, left ear 08/21/2014    Impacted cerumen of left ear    Male erectile disorder 06/26/2023    Skin lesion of back 06/26/2023    Stricture of male urethra 06/26/2023    URI, acute 06/26/2023    UTI symptoms 06/26/2023    Vitamin D insufficiency 06/26/2023    Wheezing 06/26/2023     SURGICAL HISTORY  Past Surgical History:   Procedure Laterality Date    COLONOSCOPY      OTHER SURGICAL HISTORY  08/21/2014    Urethra Surgery     ANESTHESIA HISTORY  Denies problems with anesthesia in the past such as PONV, prolonged sedation, awareness, dental damage, aspiration, cardiac arrest, difficult intubation, or unexpected hospital admissions.  Denies family history of malignant hyperthermia, or pseudocholinesterase deficiency.    SOCIAL HISTORY  , has a desk job.  Former cigarette smoker quit in 1993; EtOH: About 4-7 drinks a week; denies recreational drug use.  Patient states that a year ago he was walking about 3 miles 4-5 times a week but due to plantar fasciitis he is now walking 1 mile 3 times a week.  He states his asthma does not impact his ability to exercise.  He is able to do moderate ADLs such as heavy housework, light yard work.  Denies chest pain, shortness of breath with these activities.  METS 4    FAMILY HISTORY  Family History   Problem Relation Name Age of Onset    Other (cardiac disorder) Mother Rosario     Diabetes Mother Rosario     Other (cabg) Father Nas     Stroke Father Nas     Other (cardiac disorder) Father Nas     Hypertension Father Nas     Breast cancer Sister Virginie     Esophageal cancer Sister Virginie     Ovarian cancer Sister Virginie     Osteosarcoma Daughter Corie     Cancer Daughter Corie     Diabetes Maternal Grandmother Monica     Heart attack Maternal Grandfather      Stroke Paternal  Grandfather Rajendra      ALLERGIES  Allergies   Allergen Reactions    Nsaids (Non-Steroidal Anti-Inflammatory Drug) Shortness of breath and Wheezing     MEDICATIONS  No current facility-administered medications for this encounter.    Current Outpatient Medications:     budesonide-formoteroL (Symbicort) 80-4.5 mcg/actuation inhaler, Inhale 2 puffs 2 times a day., Disp: 10.2 g, Rfl: 11    cholecalciferol, vitamin D3, (VITAMIN D3 ORAL), Take by mouth., Disp: , Rfl:     fluticasone (Flonase) 50 mcg/actuation nasal spray, Administer 2 sprays into each nostril once daily., Disp: , Rfl:     albuterol (ProAir HFA) 90 mcg/actuation inhaler, Inhale 2 puffs every 4 hours if needed for shortness of breath or wheezing., Disp: 18 g, Rfl: 3    multivitamin tablet, Take 1 tablet by mouth once daily., Disp: , Rfl:     Review of Systems   HENT:  Positive for congestion and postnasal drip.    Musculoskeletal:         Pain from plantar fasciitis.   All other systems reviewed and are negative.      PHYSICAL EXAM  Deferred    AIRWAY EXAM  Deferred    VITALS  No vitals taken for telemedicine visit  Height: 5 feet 10 inches; weight: 250 pounds; BMI: 35.87    LABS  Lab Results   Component Value Date    WBC 6.4 08/14/2023    HGB 16.0 08/14/2023    HCT 47.5 08/14/2023    MCV 91 08/14/2023     08/14/2023     Lab Results   Component Value Date    GLUCOSE 101 (H) 08/14/2023    CALCIUM 10.0 08/14/2023     08/14/2023    K 4.3 08/14/2023    CO2 30 08/14/2023     08/14/2023    BUN 11 08/14/2023    CREATININE 0.87 08/14/2023       Lab orders placed for CBC, BMP on 02/13/2024      ASSESSMENT/PLAN  Chronic pansinusitis; nasal polyp  Nasal endoscopy with excision of tissue of ethmoid sinus    This note was created in part upon personal review of patient's medical records.  Speech recognition transcription software was used in the creation of this note. Despite proofreading, several typographical errors might be present that might  affect the meaning of the content.

## 2024-02-29 ENCOUNTER — ANESTHESIA EVENT (OUTPATIENT)
Dept: OPERATING ROOM | Facility: CLINIC | Age: 54
End: 2024-02-29
Payer: COMMERCIAL

## 2024-03-01 ENCOUNTER — ANESTHESIA (OUTPATIENT)
Dept: OPERATING ROOM | Facility: CLINIC | Age: 54
End: 2024-03-01
Payer: COMMERCIAL

## 2024-03-01 ENCOUNTER — HOSPITAL ENCOUNTER (OUTPATIENT)
Facility: CLINIC | Age: 54
Setting detail: OUTPATIENT SURGERY
Discharge: HOME | End: 2024-03-01
Attending: OTOLARYNGOLOGY | Admitting: OTOLARYNGOLOGY
Payer: COMMERCIAL

## 2024-03-01 VITALS
TEMPERATURE: 98.1 F | HEIGHT: 70 IN | SYSTOLIC BLOOD PRESSURE: 147 MMHG | WEIGHT: 246.47 LBS | BODY MASS INDEX: 35.29 KG/M2 | RESPIRATION RATE: 20 BRPM | DIASTOLIC BLOOD PRESSURE: 87 MMHG | OXYGEN SATURATION: 95 % | HEART RATE: 103 BPM

## 2024-03-01 DIAGNOSIS — J32.4 CHRONIC PANSINUSITIS: ICD-10-CM

## 2024-03-01 DIAGNOSIS — J45.20 MILD INTERMITTENT REACTIVE AIRWAY DISEASE WITHOUT COMPLICATION (HHS-HCC): ICD-10-CM

## 2024-03-01 DIAGNOSIS — J34.2 DEVIATED NASAL SEPTUM: ICD-10-CM

## 2024-03-01 DIAGNOSIS — J34.89 NASAL OBSTRUCTION: ICD-10-CM

## 2024-03-01 DIAGNOSIS — R03.0 ELEVATED BP WITHOUT DIAGNOSIS OF HYPERTENSION: ICD-10-CM

## 2024-03-01 DIAGNOSIS — Z41.9 SURGERY, ELECTIVE: Primary | ICD-10-CM

## 2024-03-01 DIAGNOSIS — G89.18 POST-OP PAIN: ICD-10-CM

## 2024-03-01 DIAGNOSIS — J33.8 POLYP OF NASAL SINUS: ICD-10-CM

## 2024-03-01 DIAGNOSIS — J34.3 HYPERTROPHY OF NASAL TURBINATES: ICD-10-CM

## 2024-03-01 PROCEDURE — 3700000002 HC GENERAL ANESTHESIA TIME - EACH INCREMENTAL 1 MINUTE: Performed by: OTOLARYNGOLOGY

## 2024-03-01 PROCEDURE — 2780000003 HC OR 278 NO HCPCS: Performed by: OTOLARYNGOLOGY

## 2024-03-01 PROCEDURE — 88304 TISSUE EXAM BY PATHOLOGIST: CPT | Performed by: PATHOLOGY

## 2024-03-01 PROCEDURE — 3600000003 HC OR TIME - INITIAL BASE CHARGE - PROCEDURE LEVEL THREE: Performed by: OTOLARYNGOLOGY

## 2024-03-01 PROCEDURE — 31276 NSL/SINS NDSC FRNT TISS RMVL: CPT | Performed by: OTOLARYNGOLOGY

## 2024-03-01 PROCEDURE — 2500000001 HC RX 250 WO HCPCS SELF ADMINISTERED DRUGS (ALT 637 FOR MEDICARE OP): Performed by: OTOLARYNGOLOGY

## 2024-03-01 PROCEDURE — 2720000007 HC OR 272 NO HCPCS: Performed by: OTOLARYNGOLOGY

## 2024-03-01 PROCEDURE — 31267 ENDOSCOPY MAXILLARY SINUS: CPT | Performed by: OTOLARYNGOLOGY

## 2024-03-01 PROCEDURE — 88305 TISSUE EXAM BY PATHOLOGIST: CPT | Performed by: PATHOLOGY

## 2024-03-01 PROCEDURE — 2500000005 HC RX 250 GENERAL PHARMACY W/O HCPCS: Performed by: OTOLARYNGOLOGY

## 2024-03-01 PROCEDURE — 3700000001 HC GENERAL ANESTHESIA TIME - INITIAL BASE CHARGE: Performed by: OTOLARYNGOLOGY

## 2024-03-01 PROCEDURE — C2625 STENT, NON-COR, TEM W/DEL SY: HCPCS | Performed by: OTOLARYNGOLOGY

## 2024-03-01 PROCEDURE — 30520 REPAIR OF NASAL SEPTUM: CPT | Performed by: OTOLARYNGOLOGY

## 2024-03-01 PROCEDURE — A4217 STERILE WATER/SALINE, 500 ML: HCPCS | Performed by: OTOLARYNGOLOGY

## 2024-03-01 PROCEDURE — 7100000002 HC RECOVERY ROOM TIME - EACH INCREMENTAL 1 MINUTE: Performed by: OTOLARYNGOLOGY

## 2024-03-01 PROCEDURE — 88311 DECALCIFY TISSUE: CPT | Performed by: PATHOLOGY

## 2024-03-01 PROCEDURE — 61782 SCAN PROC CRANIAL EXTRA: CPT | Performed by: OTOLARYNGOLOGY

## 2024-03-01 PROCEDURE — 3600000008 HC OR TIME - EACH INCREMENTAL 1 MINUTE - PROCEDURE LEVEL THREE: Performed by: OTOLARYNGOLOGY

## 2024-03-01 PROCEDURE — 7100000009 HC PHASE TWO TIME - INITIAL BASE CHARGE: Performed by: OTOLARYNGOLOGY

## 2024-03-01 PROCEDURE — 2500000001 HC RX 250 WO HCPCS SELF ADMINISTERED DRUGS (ALT 637 FOR MEDICARE OP): Performed by: ANESTHESIOLOGY

## 2024-03-01 PROCEDURE — 30140 RESECT INFERIOR TURBINATE: CPT | Performed by: OTOLARYNGOLOGY

## 2024-03-01 PROCEDURE — 31259 NSL/SINS NDSC SPHN TISS RMVL: CPT | Performed by: OTOLARYNGOLOGY

## 2024-03-01 PROCEDURE — 2500000004 HC RX 250 GENERAL PHARMACY W/ HCPCS (ALT 636 FOR OP/ED): Performed by: OTOLARYNGOLOGY

## 2024-03-01 PROCEDURE — A31255 PR NASAL/SINUS ENDOSCOPY,REMV TOTL ETHMOID: Performed by: NURSE ANESTHETIST, CERTIFIED REGISTERED

## 2024-03-01 PROCEDURE — 2500000004 HC RX 250 GENERAL PHARMACY W/ HCPCS (ALT 636 FOR OP/ED): Performed by: NURSE ANESTHETIST, CERTIFIED REGISTERED

## 2024-03-01 PROCEDURE — 7100000010 HC PHASE TWO TIME - EACH INCREMENTAL 1 MINUTE: Performed by: OTOLARYNGOLOGY

## 2024-03-01 PROCEDURE — 2500000005 HC RX 250 GENERAL PHARMACY W/O HCPCS: Performed by: NURSE ANESTHETIST, CERTIFIED REGISTERED

## 2024-03-01 PROCEDURE — 7100000001 HC RECOVERY ROOM TIME - INITIAL BASE CHARGE: Performed by: OTOLARYNGOLOGY

## 2024-03-01 PROCEDURE — A31255 PR NASAL/SINUS ENDOSCOPY,REMV TOTL ETHMOID: Performed by: ANESTHESIOLOGY

## 2024-03-01 PROCEDURE — 88305 TISSUE EXAM BY PATHOLOGIST: CPT | Mod: TC,59,SUR | Performed by: OTOLARYNGOLOGY

## 2024-03-01 DEVICE — IMPLANT, PROPEL CONTOUR SINUS: Type: IMPLANTABLE DEVICE | Site: SINUS | Status: FUNCTIONAL

## 2024-03-01 RX ORDER — MIDAZOLAM HYDROCHLORIDE 1 MG/ML
INJECTION, SOLUTION INTRAMUSCULAR; INTRAVENOUS AS NEEDED
Status: DISCONTINUED | OUTPATIENT
Start: 2024-03-01 | End: 2024-03-01

## 2024-03-01 RX ORDER — FENTANYL CITRATE 50 UG/ML
25 INJECTION, SOLUTION INTRAMUSCULAR; INTRAVENOUS EVERY 5 MIN PRN
Status: DISCONTINUED | OUTPATIENT
Start: 2024-03-01 | End: 2024-03-01 | Stop reason: HOSPADM

## 2024-03-01 RX ORDER — PREDNISONE 10 MG/1
TABLET ORAL
Qty: 42 TABLET | Refills: 0 | Status: SHIPPED | OUTPATIENT
Start: 2024-03-01 | End: 2024-03-01 | Stop reason: SDUPTHER

## 2024-03-01 RX ORDER — NORETHINDRONE AND ETHINYL ESTRADIOL 0.5-0.035
KIT ORAL AS NEEDED
Status: DISCONTINUED | OUTPATIENT
Start: 2024-03-01 | End: 2024-03-01

## 2024-03-01 RX ORDER — SODIUM CHLORIDE, SODIUM LACTATE, POTASSIUM CHLORIDE, CALCIUM CHLORIDE 600; 310; 30; 20 MG/100ML; MG/100ML; MG/100ML; MG/100ML
100 INJECTION, SOLUTION INTRAVENOUS CONTINUOUS
Status: DISCONTINUED | OUTPATIENT
Start: 2024-03-01 | End: 2024-03-01 | Stop reason: HOSPADM

## 2024-03-01 RX ORDER — DOXYCYCLINE 100 MG/1
100 CAPSULE ORAL 2 TIMES DAILY
Qty: 20 CAPSULE | Refills: 0 | Status: SHIPPED | OUTPATIENT
Start: 2024-03-01 | End: 2024-03-01 | Stop reason: SDUPTHER

## 2024-03-01 RX ORDER — FENTANYL CITRATE 50 UG/ML
INJECTION, SOLUTION INTRAMUSCULAR; INTRAVENOUS AS NEEDED
Status: DISCONTINUED | OUTPATIENT
Start: 2024-03-01 | End: 2024-03-01

## 2024-03-01 RX ORDER — ONDANSETRON HYDROCHLORIDE 2 MG/ML
INJECTION, SOLUTION INTRAVENOUS AS NEEDED
Status: DISCONTINUED | OUTPATIENT
Start: 2024-03-01 | End: 2024-03-01

## 2024-03-01 RX ORDER — SODIUM CHLORIDE, SODIUM LACTATE, POTASSIUM CHLORIDE, CALCIUM CHLORIDE 600; 310; 30; 20 MG/100ML; MG/100ML; MG/100ML; MG/100ML
INJECTION, SOLUTION INTRAVENOUS CONTINUOUS PRN
Status: DISCONTINUED | OUTPATIENT
Start: 2024-03-01 | End: 2024-03-01

## 2024-03-01 RX ORDER — METOCLOPRAMIDE HYDROCHLORIDE 5 MG/ML
10 INJECTION INTRAMUSCULAR; INTRAVENOUS ONCE AS NEEDED
Status: DISCONTINUED | OUTPATIENT
Start: 2024-03-01 | End: 2024-03-01 | Stop reason: HOSPADM

## 2024-03-01 RX ORDER — LIDOCAINE IN NACL,ISO-OSMOT/PF 30 MG/3 ML
0.1 SYRINGE (ML) INJECTION ONCE
Status: DISCONTINUED | OUTPATIENT
Start: 2024-03-01 | End: 2024-03-01 | Stop reason: HOSPADM

## 2024-03-01 RX ORDER — TRAMADOL HYDROCHLORIDE 50 MG/1
50 TABLET ORAL EVERY 6 HOURS PRN
Qty: 15 TABLET | Refills: 0 | Status: SHIPPED | OUTPATIENT
Start: 2024-03-01 | End: 2024-03-01 | Stop reason: SDUPTHER

## 2024-03-01 RX ORDER — DOXYCYCLINE 100 MG/1
100 CAPSULE ORAL 2 TIMES DAILY
Qty: 20 CAPSULE | Refills: 0 | Status: SHIPPED | OUTPATIENT
Start: 2024-03-01 | End: 2024-03-11

## 2024-03-01 RX ORDER — ROCURONIUM BROMIDE 10 MG/ML
INJECTION, SOLUTION INTRAVENOUS AS NEEDED
Status: DISCONTINUED | OUTPATIENT
Start: 2024-03-01 | End: 2024-03-01

## 2024-03-01 RX ORDER — LABETALOL HYDROCHLORIDE 5 MG/ML
5 INJECTION, SOLUTION INTRAVENOUS ONCE AS NEEDED
Status: DISCONTINUED | OUTPATIENT
Start: 2024-03-01 | End: 2024-03-01 | Stop reason: HOSPADM

## 2024-03-01 RX ORDER — ACETAMINOPHEN 325 MG/1
650 TABLET ORAL EVERY 4 HOURS PRN
Status: DISCONTINUED | OUTPATIENT
Start: 2024-03-01 | End: 2024-03-01 | Stop reason: HOSPADM

## 2024-03-01 RX ORDER — TRAMADOL HYDROCHLORIDE 50 MG/1
50 TABLET ORAL EVERY 6 HOURS PRN
Qty: 15 TABLET | Refills: 0 | Status: SHIPPED | OUTPATIENT
Start: 2024-03-01

## 2024-03-01 RX ORDER — PREDNISONE 10 MG/1
TABLET ORAL
Qty: 42 TABLET | Refills: 0 | Status: SHIPPED | OUTPATIENT
Start: 2024-03-01 | End: 2024-03-13

## 2024-03-01 RX ORDER — PROPOFOL 10 MG/ML
INJECTION, EMULSION INTRAVENOUS AS NEEDED
Status: DISCONTINUED | OUTPATIENT
Start: 2024-03-01 | End: 2024-03-01

## 2024-03-01 RX ORDER — DEXAMETHASONE SODIUM PHOSPHATE 100 MG/10ML
INJECTION INTRAMUSCULAR; INTRAVENOUS AS NEEDED
Status: DISCONTINUED | OUTPATIENT
Start: 2024-03-01 | End: 2024-03-01

## 2024-03-01 RX ORDER — SODIUM CHLORIDE 0.9 G/100ML
IRRIGANT IRRIGATION AS NEEDED
Status: DISCONTINUED | OUTPATIENT
Start: 2024-03-01 | End: 2024-03-01 | Stop reason: HOSPADM

## 2024-03-01 RX ORDER — LIDOCAINE HCL/PF 100 MG/5ML
SYRINGE (ML) INTRAVENOUS AS NEEDED
Status: DISCONTINUED | OUTPATIENT
Start: 2024-03-01 | End: 2024-03-01

## 2024-03-01 RX ORDER — ALBUTEROL SULFATE 0.83 MG/ML
2.5 SOLUTION RESPIRATORY (INHALATION) ONCE AS NEEDED
Status: DISCONTINUED | OUTPATIENT
Start: 2024-03-01 | End: 2024-03-01 | Stop reason: HOSPADM

## 2024-03-01 RX ORDER — FENTANYL CITRATE 50 UG/ML
50 INJECTION, SOLUTION INTRAMUSCULAR; INTRAVENOUS EVERY 5 MIN PRN
Status: DISCONTINUED | OUTPATIENT
Start: 2024-03-01 | End: 2024-03-01 | Stop reason: HOSPADM

## 2024-03-01 RX ORDER — OXYMETAZOLINE HCL 0.05 %
SPRAY, NON-AEROSOL (ML) NASAL AS NEEDED
Status: DISCONTINUED | OUTPATIENT
Start: 2024-03-01 | End: 2024-03-01 | Stop reason: HOSPADM

## 2024-03-01 RX ORDER — OXYCODONE HYDROCHLORIDE 5 MG/1
5 TABLET ORAL EVERY 6 HOURS PRN
Status: DISCONTINUED | OUTPATIENT
Start: 2024-03-01 | End: 2024-03-01 | Stop reason: HOSPADM

## 2024-03-01 RX ORDER — LABETALOL HYDROCHLORIDE 5 MG/ML
INJECTION, SOLUTION INTRAVENOUS AS NEEDED
Status: DISCONTINUED | OUTPATIENT
Start: 2024-03-01 | End: 2024-03-01

## 2024-03-01 RX ORDER — ONDANSETRON HYDROCHLORIDE 2 MG/ML
4 INJECTION, SOLUTION INTRAVENOUS ONCE AS NEEDED
Status: DISCONTINUED | OUTPATIENT
Start: 2024-03-01 | End: 2024-03-01 | Stop reason: HOSPADM

## 2024-03-01 RX ORDER — CEFAZOLIN 1 G/1
INJECTION, POWDER, FOR SOLUTION INTRAVENOUS AS NEEDED
Status: DISCONTINUED | OUTPATIENT
Start: 2024-03-01 | End: 2024-03-01

## 2024-03-01 RX ORDER — LIDOCAINE HYDROCHLORIDE AND EPINEPHRINE 10; 10 MG/ML; UG/ML
INJECTION, SOLUTION INFILTRATION; PERINEURAL AS NEEDED
Status: DISCONTINUED | OUTPATIENT
Start: 2024-03-01 | End: 2024-03-01 | Stop reason: HOSPADM

## 2024-03-01 RX ADMIN — EPHEDRINE SULFATE 5 MG: 50 INJECTION, SOLUTION INTRAVENOUS at 09:06

## 2024-03-01 RX ADMIN — LABETALOL HYDROCHLORIDE 7.5 MG: 5 INJECTION, SOLUTION INTRAVENOUS at 08:36

## 2024-03-01 RX ADMIN — FENTANYL CITRATE 50 MCG: 50 INJECTION, SOLUTION INTRAMUSCULAR; INTRAVENOUS at 07:45

## 2024-03-01 RX ADMIN — ROCURONIUM BROMIDE 70 MG: 10 INJECTION, SOLUTION INTRAVENOUS at 07:46

## 2024-03-01 RX ADMIN — REMIFENTANIL HYDROCHLORIDE 0.05 MCG/KG/MIN: 1 INJECTION, POWDER, LYOPHILIZED, FOR SOLUTION INTRAVENOUS at 08:18

## 2024-03-01 RX ADMIN — EPHEDRINE SULFATE 10 MG: 50 INJECTION, SOLUTION INTRAVENOUS at 09:57

## 2024-03-01 RX ADMIN — ONDANSETRON 4 MG: 2 INJECTION INTRAMUSCULAR; INTRAVENOUS at 10:51

## 2024-03-01 RX ADMIN — ROCURONIUM BROMIDE 10 MG: 10 INJECTION, SOLUTION INTRAVENOUS at 09:56

## 2024-03-01 RX ADMIN — SODIUM CHLORIDE, SODIUM LACTATE, POTASSIUM CHLORIDE, AND CALCIUM CHLORIDE: .6; .31; .03; .02 INJECTION, SOLUTION INTRAVENOUS at 07:19

## 2024-03-01 RX ADMIN — SUGAMMADEX 200 MG: 100 INJECTION, SOLUTION INTRAVENOUS at 11:08

## 2024-03-01 RX ADMIN — FENTANYL CITRATE 50 MCG: 50 INJECTION, SOLUTION INTRAMUSCULAR; INTRAVENOUS at 08:11

## 2024-03-01 RX ADMIN — LIDOCAINE HYDROCHLORIDE 100 MG: 20 INJECTION INTRAVENOUS at 07:45

## 2024-03-01 RX ADMIN — MIDAZOLAM 2 MG: 1 INJECTION INTRAMUSCULAR; INTRAVENOUS at 07:36

## 2024-03-01 RX ADMIN — CEFAZOLIN 2 G: 1 INJECTION, POWDER, FOR SOLUTION INTRAMUSCULAR; INTRAVENOUS at 08:02

## 2024-03-01 RX ADMIN — PROPOFOL 200 MG: 10 INJECTION, EMULSION INTRAVENOUS at 07:45

## 2024-03-01 RX ADMIN — DEXAMETHASONE SODIUM PHOSPHATE 10 MG: 10 INJECTION INTRAMUSCULAR; INTRAVENOUS at 07:50

## 2024-03-01 RX ADMIN — EPHEDRINE SULFATE 5 MG: 50 INJECTION, SOLUTION INTRAVENOUS at 08:41

## 2024-03-01 RX ADMIN — OXYCODONE HYDROCHLORIDE 5 MG: 5 TABLET ORAL at 11:42

## 2024-03-01 SDOH — HEALTH STABILITY: MENTAL HEALTH: CURRENT SMOKER: 0

## 2024-03-01 ASSESSMENT — PAIN - FUNCTIONAL ASSESSMENT
PAIN_FUNCTIONAL_ASSESSMENT: 0-10

## 2024-03-01 ASSESSMENT — PAIN SCALES - GENERAL
PAINLEVEL_OUTOF10: 0 - NO PAIN
PAINLEVEL_OUTOF10: 3
PAINLEVEL_OUTOF10: 0 - NO PAIN
PAINLEVEL_OUTOF10: 5 - MODERATE PAIN

## 2024-03-01 ASSESSMENT — COLUMBIA-SUICIDE SEVERITY RATING SCALE - C-SSRS
1. IN THE PAST MONTH, HAVE YOU WISHED YOU WERE DEAD OR WISHED YOU COULD GO TO SLEEP AND NOT WAKE UP?: NO
2. HAVE YOU ACTUALLY HAD ANY THOUGHTS OF KILLING YOURSELF?: NO
6. HAVE YOU EVER DONE ANYTHING, STARTED TO DO ANYTHING, OR PREPARED TO DO ANYTHING TO END YOUR LIFE?: NO

## 2024-03-01 ASSESSMENT — PAIN DESCRIPTION - LOCATION: LOCATION: NECK

## 2024-03-01 NOTE — DISCHARGE INSTRUCTIONS
Nasal and Sinus Surgery at Home Instructions  The following instructions will help you know what to expect in the days following your surgery.     Please have the following items available at your home/recovery residence:  · NeilMed Sinus Rinse® bottle or equivalent for post-surgical nasal irrigations  · Oxymetazoline (Afrin®) or Phenylephrine (Bk-Synephrine®) are topical decongestant sprays   - they should ONLY be used if you have a nosebleed or excessive bleeding  · 4 x 4 gauze and paper tape  · Tylenol® (adjunct to prescription therapy or as sole pain medication)    Activities  · You may shower the same day as your surgery   · Avoid sneezing or blowing your nose for 2 weeks after surgery / if you do sneeze, do so with your mouth open  · Avoid strenuous activity for 2 weeks after surgery / do not lift heavy objects (greater than 10 pounds) for 1 week after surgery  · Walking is strongly encouraged after surgery   · Most patients return to work without about 5 to 7 days after surgery but this is variable    Diet  · You can resume a normal diet within 24 hours of the surgery      Fever  · A low-grade fever, less than 101° F is not uncommon for 2 to 3 days after surgery. If fever continues or is higher than 101° F, call your physician.  You can use Tylenol® to control the fever.      Pain Control  · Pain is variable after nasal surgery but is generally well controlled with pain medication.  Most patients feel pressure and congestion.  Pain should lessen with time. If pain increases, call our office.  · For mild pain, acetaminophen (Tylenol®) is recommended.  We suggest scheduling 500-1000 mg of acetaminophen every 6 hours for the first several days (unless you are allergic to this medicine or have problems with your liver).  Do not take more than 4000mg in a day.  We will also prescribe stronger pain medication for after surgery. Drink plenty of water as these stronger pain medications can be constipating.  We  also recommend that you take stool softeners on a regular basis while taking narcotic pain medication.  -Do not take ibuprofen, Advil, Motrin, aspirin, or other NSAIDS (non-steroidal anti-inflammatory). These medications increase your risk of bleeding.    Drainage  · You can expect to have bloody nasal drainage after nasal or sinus surgery. To catch   this drainage and to help avoid continuous nose wiping, we usually put a gauze “mustache” dressing under the nose and tape it to the cheeks for as long as the drainage continues.    · BLEEDING: Some blood in the nasal drainage after surgery is expected. This may be red, dark red or brown. One way to monitor this is to tape a piece of gauze under the nose to collect the bloody drainage. This may saturate with blood every 1 to 2 hours for the first few days after surgery. If it saturates completely with blood (blood dripping off of it and totally red) MORE FREQUENTLY THAN EVERY 30 MINUTES then call our office. Avoid nose blowing and try to sneeze with your mouth open.  Sleeping with your head elevated for at least the first night will also help prevent bleeding and reduce congestion. If you have a nosebleed, try spraying a topical decongestant spray (see page 1) into the side of bleeding 2-3 sprays and hold gentle pressure for 10 minutes.  If the bleeding continues after this is done twice call our office.       Care of the Nose  ·   NASAL SALINE IRRIGATION: THIS IS VERY IMPORTANT - Please START IRRIGATIONS THE MORNING AFTER SURGERY. After sinus surgery, we like to have the nose irrigated with a NeilMed Sinus Rinse® bottle (irrigation instructions and saline solution recipe are listed). This will help rinse the blood clots and mucous from the nose.   We recommend doing your nasal irrigations AT LEAST 3 TIMES A DAY UNTIL YOUR FOLLOW UP WITH YOUR SURGEON. IF YOU CAN DO IT 5-6 TIMES A DAY THAT WOULD BE IDEAL.  · Try not to manipulate your nose with your fingers     How to  Irrigate  · Fill the NeilMed Sinus Rinse® bottle with the room temperature saline solution (see below for recipe). Gently insert the tip into one nostril and squeeze. Keep your head down and blow out through your mouth when you irrigate to prevent water from going back down into your throat. Use about one half of the bottle per nostril. Do this for each nostril at least three times daily until your follow up with your surgeon. The more you can do the irrigation the better. You will likely be irrigating regularly for at least a month or two.    Nasal Irrigation Solution Recipe  · 8 ounces of room temperature distilled water. (If you use tap water, boil it first and let it cool to room temperature.)  · ½ teaspoon of table salt  · ½ teaspoon of baking soda  You can use the NeilMed Sinus Rinse® solution packets if preferred    Follow up  Your appointment for follow up after your surgery should have been scheduled at the time of your surgery. If not, call the office for an appointment scheduled for 1-2 weeks after the date of your surgery.    Call the office or GO TO THE EMERGENCY ROOM if you have:  · Excessive pain, swelling, bleeding or drainage  · Shortness of breath or difficulty breathing  · Persistent nausea and vomiting  · Fever that continues or is higher than 101° F  · Neck stiffness (cannot touch your chin to your chest)  · Confusion  · Worsening headaches that do not respond to pain medication  · Reproducible clear drainage dripping from your nose like a leaky faucet (particularly one sided).  Note:  This may happen and is normal up to 30 minutes following saline irrigations or sprays but if it is reproducible despite stopping saline please contact our office   · Salty or metallic taste (note that you may experience a salty taste that is normal up to 30 minutes following saline use)    Do not hesitate to call if you have any questions or concerns:  Offices of Otolaryngology - Division of Rhinology  Dr. Eden  [phone]  Normal office hours are:  8am-4pm Monday - Friday   If there is an after-hours EMERGENCY related to your procedure please call [phone] (ask for the “ENT resident on-call”).

## 2024-03-01 NOTE — OP NOTE
Operative Note     Date: 3/1/2024  OR Location: [facility] OR    Name: Son Rouse, : 1970, Age: 53 y.o., MRN: [id number], Sex: male    Diagnosis  Pre-op Diagnosis     * Chronic pansinusitis [J32.4]     * Deviated nasal septum [J34.2]     * Polyp of nasal sinus [J33.8]     * Hypertrophy of nasal turbinates [J34.3]     * Nasal obstruction [J34.89] Post-op Diagnosis     * Chronic pansinusitis [J32.4]     * Deviated nasal septum [J34.2]     * Polyp of nasal sinus [J33.8]     * Hypertrophy of nasal turbinates [J34.3]     * Nasal obstruction [J34.89]       Operation/Procedure(s):      1. Bilateral endoscopic total ethmoidectomies with sphenoidotomies with removal of tissue from sinuses  2. Bilateral endoscopic frontal sinusotomies with frontal sinus explorations - modifier 22  3. Bilateral endoscopic maxillary antrostomies with removal of tissue from sinuses  4. Septoplasty  5. Bilateral inferior turbinate submucous resection  6. Image guidance navigation - extradural    Surgeon: Donnie Eden MD FACS    Assistant(s): Art Greco DO    EBL: 200 ml    Anesthesia: General and local     Operative Findings:  1. Chronic inflammation through all sinuses, with nasal sinus polyps  2. Absorbable hemostatic material in the ethmoids  3. Barba Splint sutured to the septum bilaterally   4. Propel Contour stents placed in the frontal sinusotomies bilaterally  5. Naturally narrow frontal sinus outflow tracts with extensive polyposis and bleeding resulting in a complex procedure modifier 22.    Operative Indications:   Son Rouse is a 53 y.o.  male  with a history of chronic rhinosinusitis - Bilateral pansinusitis, deviated nasal septum, inferior turbinate hypertrophy, nasal sinus polyps. The patient was treated with maximal medical therapy and a post-treatment CT scan showed persistent disease. Therefore, the risks, benefits, and alternatives of the above procedures were discussed and the patient agreed to proceed.  Please see the ambulatory EMR for full documentation and detail of this discussion.    Operative/Procedure Narrative:   The patient was brought into the operating room and laid in a supine position on the operating room table. A surgical huddle was conducted to verify the patient and the procedure to be performed. General anesthesia was induced and the patient's airway was secured with an ET tube. A time out was called. The nasal cavities were sprayed with 0.5% Afrin. The right and left nasal cavities were then injected with lidocaine 1% with 1:100,000 epinephrine. Next, image guidance was attached and registered. The image guidance was used through the procedure for identification of critical landmarks. Once the image guidance was calibrated, the nasal cavities were examined with a 0 degree endoscope.    The patient was noted to have enlarged inferior turbinates on the right and left, a broad septal deviation to the right.   Using a zero degree endoscope for visualization and a [place] elevator on the left, the middle turbinate was medialized. The double ball probe and backbiting forceps were used to perform a retrograde uncinectomy. The probe was then used to cannulate the maxillary os and a large maxillary antrostomy was made with a straight through cutting forceps. The edges were cleaned up with the microdebrider. The maxillary sinus was entered and suctioned and tissue was removed. Attention was then turned to the ethmoid air cells. The ethmoid bulla and basal lamella were resected with cutting instruments and the microdebrider. The location of the skull base and lamina papyracea was periodically confirmed with the surgical navigation. Posteriorly, the superior turbinate was identified and the inferior third was excised using the microdebrider. The sphenoid ostium was cannulated using the probe. The mushroom punch was used to complete a sphenoidotomy. The sphenoid sinusotomy was further dissected using the  cutting instruments and tissue was removed.  We then proceeded in a posterior to anterior fashion dissecting remnant ethmoid air cells off the skull base and orbit to complete a total ethmoidectomy. We then switched to a 70 degree scope and visualized the frontal outflow tract. Using a frontal sinus probe, the frontal os was identified. A frontal sinusotomy was created using a Hosemann punch, giraffe forceps and a microdebrider. There was extensive time and excessive bleeding for this portion of the case, thus qualifying the frontal sinusotomy as a complex procedure and a modifier 22.  The middle turbinate was removed as it was largely involved with disease and destabilized.     Attention was then turned towards the septoplasty. A #15 blade was used to make a Modified Gonzalez incision at the squamocolumnar junction on the right. A Andrews elevator was used to elevate a mucoperichondrial flap on the right septum. An incision was made through the cartilage using the suction elevator then the opposing septal flap was elevated. Using a Juanito forceps the intervening septal cartilage was removed, taking care to leave an approximately 2-cm dorsal and caudal cartilaginous strut for support. There were unilateral mucosal tears, so a posterior control hole was created. The septum was then noted to be straight and the airway improved on both sides. The incision was closed using interrupted 4-0 plain gut sutures.     The right nasal cavity was then examined with the 0 degree endoscope and the middle turbinate was medialized with a freer.  The double ball probe and backbiting forceps were used to perform a retrograde uncinectomy. The probe was then used to cannulate the maxillary os and a large maxillary antrostomy was made with a straight through cutting forceps. The edges were cleaned up with the microdebrider. The maxillary sinus was entered and suctioned and tissue was removed. Attention was then turned to the ethmoid air  cells. The ethmoid bulla and basal lamella were resected with cutting instruments and the microdebrider. The location of the skull base and lamina papyracea was periodically confirmed with the surgical navigation. Posteriorly, the superior turbinate was identified and the inferior third was excised using the microdebrider. The sphenoid ostium was cannulated using the probe. The mushroom punch was used to complete a sphenoidotomy. The sphenoid sinusotomy was further dissected using the cutting instruments and tissue was removed.  We then proceeded in a posterior to anterior fashion dissecting remnant ethmoid air cells off the skull base and orbit to complete a total ethmoidectomy. We then switched to a 70 degree scope and visualized the frontal outflow tract. Using a frontal sinus probe, the frontal os was identified. A frontal sinusotomy was created using a Hosemann punch, giraffe forceps and a microdebrider. There was extensive time and excessive bleeding for this portion of the case, thus qualifying the frontal sinusotomy as a complex procedure and a modifier 22. The middle turbinate was removed as it was largely involved with disease and destabilized.     The right and left inferior turbinates were then infiltrated with 1% lidocaine with epinephrine. A stab incision was then made in the head of the left inferior turbinate and the right inferior turbinate. A [place] elevator was used to elevate the mucous membrane off the inferior conchal bone on both sides and the microdebrider inferior turbinate blade attachment was then used to perform a submucous resection of tissue in the right and then the left inferior turbinates. The turbinates were then outfractured laterally with a Boies elevator. The nasopharynx was then suctioned and the nose was copiously irrigated with normal saline. It was inspected for any bleeding and none was noted. Propel Contour stents were placed in the frontal sinusotomies bilaterally.  Absorbable hemostatic material was placed in the bilateral ethmoid cavities. Barba splints were placed and secured to the septum with a prolene suture.  This completed the surgical procedure. An OG tube was passed to suction out gastric contents. The patient was turned over to the anesthesia team for awakening and extubation. They were transferred to the PACU in stable condition.     Implants/Packing: Absorbable hemostatic material  , propel contour stent(s), and Barba splints sutured to septum     Specimens:   Sinonasal contents to pathology     Complications:   None    Attestation: I, Donnie Eden, was present for and completed all key portions of the procedure with the assistance of the resident as I deemed fit.        Donnie Eden  Phone Number: [phone]

## 2024-03-01 NOTE — INTERVAL H&P NOTE
H&P reviewed. The patient was examined and there are no changes to the H&P.  CV: peripheral perfusion intact, no cyanosis clubbing or edema  Respiratory: Normal chest expansion, no stridor or stertor

## 2024-03-01 NOTE — ANESTHESIA PROCEDURE NOTES
Airway  Date/Time: 3/1/2024 7:54 AM  Urgency: elective    Airway not difficult    Staffing  Performed: CRNA   Authorized by: Kesha Giang MD    Performed by: ZAK Rojas-HENRY  Patient location during procedure: OR    Indications and Patient Condition  Indications for airway management: anesthesia  Spontaneous Ventilation: absent  Sedation level: deep  Preoxygenated: yes  Patient position: sniffing  Mask difficulty assessment: 1 - vent by mask    Final Airway Details  Final airway type: endotracheal airway      Successful airway: ETT  Cuffed: yes   Successful intubation technique: video laryngoscopy  Facilitating devices/methods: cricoid pressure  Endotracheal tube insertion site: oral  Blade: Olivia  Blade size: #4  ETT size (mm): 7.5  Cormack-Lehane Classification: grade I - full view of glottis  Placement verified by: chest auscultation and capnometry   Measured from: teeth  ETT to teeth (cm): 21  Number of attempts at approach: 1           C/o lower back pain x 3 days with difficulty walking. Denies injury, reports heavy lifting at work. Denies any dysuria.

## 2024-03-01 NOTE — ANESTHESIA PREPROCEDURE EVALUATION
Patient: Son Rouse    Procedure Information       Anesthesia Start Date/Time: 03/01/24 0740    Procedures:       Nasal Endoscopy with Excision Tissue Ethmoid Sinus (Bilateral)      Nasal Endoscopy with Excision Tissue Frontal Sinus (Bilateral)      Nasal Endoscopy with Excision Tissue Maxillary Sinus (Bilateral)      Repair Septum Nasal Cavity      Excision Nasal Turbinate (Bilateral)      Navigation-Assisted Surgery    [place] [facility] [place] [facility] [place] [facility]    Surgeons: Donnie Eden MD          Vitals:    03/01/24 0702   BP: (!) 142/94   Pulse: 101   Resp: 16   Temp: 36.4 °C (97.5 °F)   SpO2: 98%       Past Surgical History:   Procedure Laterality Date   • COLONOSCOPY     • OTHER SURGICAL HISTORY  08/21/2014    Urethra Surgery     Past Medical History:   Diagnosis Date   • Allergic 4 years ago   • Asthma 4 years ago   • Bursitis of right shoulder 06/26/2023   • Cellulitis of right lower extremity 06/26/2023   • Chronic allergic conjunctivitis 06/26/2023   • Corn of foot 06/26/2023   • Elevated blood sugar 06/26/2023   • Encounter for immunization 12/23/2014    Need for pneumococcal vaccination   • Hemorrhoids 06/26/2023   • Impacted cerumen, left ear 08/21/2014    Impacted cerumen of left ear   • Male erectile disorder 06/26/2023   • Skin lesion of back 06/26/2023   • Stricture of male urethra 06/26/2023   • URI, acute 06/26/2023   • UTI symptoms 06/26/2023   • Vitamin D insufficiency 06/26/2023   • Wheezing 06/26/2023     No current facility-administered medications for this encounter.    Facility-Administered Medications Ordered in Other Encounters:   •  lactated Ringer's infusion, , intravenous, Continuous PRN, Darcy Fishman, APRN-CRNA, New Bag at 03/01/24 0719  Prior to Admission medications    Medication Sig Start Date End Date Taking? Authorizing Provider   budesonide-formoteroL (Symbicort) 80-4.5 mcg/actuation inhaler Inhale 2 puffs 2 times a day. 6/29/23 6/28/24 Yes Kathryn Llanos  "Brininger, APRN-CNP   cholecalciferol, vitamin D3, (VITAMIN D3 ORAL) Take by mouth.   Yes Historical Provider, MD   fluticasone (Flonase) 50 mcg/actuation nasal spray Administer 2 sprays into each nostril once daily. 23  Yes Historical Provider, MD   multivitamin tablet Take 1 tablet by mouth once daily.   Yes Historical Provider, MD   albuterol (ProAir HFA) 90 mcg/actuation inhaler Inhale 2 puffs every 4 hours if needed for shortness of breath or wheezing. 23   RONALDO Mccormack     Allergies   Allergen Reactions   • Nsaids (Non-Steroidal Anti-Inflammatory Drug) Shortness of breath and Wheezing     Social History     Tobacco Use   • Smoking status: Former     Types: Cigarettes     Quit date:      Years since quittin.   • Smokeless tobacco: Former     Types: Chew   Substance Use Topics   • Alcohol use: Yes     Alcohol/week: 8.0 standard drinks of alcohol     Types: 8 Standard drinks or equivalent per week         Chemistry    Lab Results   Component Value Date/Time     2023 0954    K 4.3 2023 0954     2023 0954    CO2 30 2023 0954    BUN 11 2023 0954    CREATININE 0.87 2023 0954    Lab Results   Component Value Date/Time    CALCIUM 10.0 2023 0954    ALKPHOS 80 2023 0954    AST 24 2023 0954    ALT 35 2023 0954    BILITOT 0.6 2023 0954          Lab Results   Component Value Date/Time    WBC 6.4 2023 0954    HGB 16.0 2023 0954    HCT 47.5 2023 0954     2023 0954     No results found for: \"PROTIME\", \"PTT\", \"INR\"  No results found for this or any previous visit (from the past 4464 hour(s)).  No results found for this or any previous visit from the past 1095 days.        Relevant Problems   No relevant active problems       Clinical information reviewed:   Tobacco  Allergies  Meds   Med Hx  Surg Hx   Fam Hx  Soc Hx        NPO Detail:  NPO/Void Status  Carbohydrate Drink " Given Prior to Surgery? : N  Date of Last Liquid: 02/29/24  Time of Last Liquid: 2100  Date of Last Solid: 02/29/24  Time of Last Solid: 2100  Last Intake Type: Clear fluids  Time of Last Void: 0600         Physical Exam    Airway  Mallampati: III  TM distance: >3 FB  Neck ROM: full     Cardiovascular   Rhythm: regular  Rate: normal     Dental - normal exam     Pulmonary   Breath sounds clear to auscultation     Abdominal        Anesthesia Plan    History of general anesthesia?: yes  History of complications of general anesthesia?: no    ASA 2     general   (Hx of daily EtOH use, stopped drinking a few weeks ago. )  The patient is not a current smoker.    intravenous induction   Anesthetic plan and risks discussed with patient.    Plan discussed with CRNA.

## 2024-03-01 NOTE — ANESTHESIA POSTPROCEDURE EVALUATION
Patient: Son Rouse    Procedure Summary       Date: 03/01/24 [place] [facility] [facility] [place] [facility] [place]    Anesthesia Start: 0738 Anesthesia Stop: 1124    Procedures:       Nasal Endoscopy with Excision Tissue Ethmoid Sinus (Bilateral)      Nasal Endoscopy with Excision Tissue Frontal Sinus (Bilateral)      Nasal Endoscopy with Excision Tissue Maxillary Sinus (Bilateral)      Repair Septum Nasal Cavity      Excision Nasal Turbinate (Bilateral)      Navigation-Assisted Surgery Diagnosis:       Chronic pansinusitis      Deviated nasal septum      Polyp of nasal sinus      Hypertrophy of nasal turbinates      Nasal obstruction      (Chronic pansinusitis [J32.4])      (Deviated nasal septum [J34.2])      (Polyp of nasal sinus [J33.8])    Surgeons: Donnie Eden MD Responsible Provider: Kesha Giang MD    Anesthesia Type: general ASA Status: 2            Anesthesia Type: general    Vitals Value Taken Time   /89 03/01/24 1146   Temp 36.7 °C (98.1 °F) 03/01/24 1146   Pulse 101 03/01/24 1146   Resp 18 03/01/24 1146   SpO2 95 % 03/01/24 1146       Anesthesia Post Evaluation    Patient participation: complete - patient participated  Level of consciousness: awake and alert  Pain management: adequate  Airway patency: patent  Cardiovascular status: acceptable  Respiratory status: acceptable  Hydration status: acceptable  Postoperative Nausea and Vomiting: none    No notable events documented.

## 2024-03-07 ENCOUNTER — OFFICE VISIT (OUTPATIENT)
Dept: OTOLARYNGOLOGY | Facility: CLINIC | Age: 54
End: 2024-03-07
Payer: COMMERCIAL

## 2024-03-07 DIAGNOSIS — J32.4 CHRONIC PANSINUSITIS: Primary | ICD-10-CM

## 2024-03-07 DIAGNOSIS — J32.8 OTHER CHRONIC SINUSITIS: ICD-10-CM

## 2024-03-07 DIAGNOSIS — J34.89 NASAL CRUSTING: ICD-10-CM

## 2024-03-07 DIAGNOSIS — J32.4 CHRONIC PANSINUSITIS: ICD-10-CM

## 2024-03-07 PROCEDURE — 31237 NSL/SINS NDSC SURG BX POLYPC: CPT | Performed by: OTOLARYNGOLOGY

## 2024-03-07 PROCEDURE — 99024 POSTOP FOLLOW-UP VISIT: CPT | Performed by: OTOLARYNGOLOGY

## 2024-03-07 RX ORDER — BUDESONIDE
1 POWDER (GRAM) MISCELLANEOUS 2 TIMES DAILY
Qty: 180 G | Refills: 0 | Status: SHIPPED | OUTPATIENT
Start: 2024-03-07 | End: 2024-06-05

## 2024-03-07 RX ORDER — BUDESONIDE 0.5 MG/2ML
0.5 INHALANT ORAL 2 TIMES DAILY
Qty: 180 ML | Refills: 0 | Status: SHIPPED | OUTPATIENT
Start: 2024-03-07 | End: 2024-03-07

## 2024-03-07 RX ORDER — BUDESONIDE 0.5 MG/2ML
0.5 INHALANT ORAL 2 TIMES DAILY
Qty: 180 ML | Refills: 0 | Status: SHIPPED | OUTPATIENT
Start: 2024-03-07 | End: 2024-03-07 | Stop reason: SDUPTHER

## 2024-03-07 NOTE — PROGRESS NOTES
HPI   Son Rouse is a 53 y.o. old male h/o chronic pansinusitis w NP, right DNS, ITH, nasal obstruction s/p bilateral ESS (total), septoplasty, bilateral ITR. The patient had surgery 3/1/2024.  3/7/24 - Patient presents for his 1st post op visit. He reports no acute concerns. He reports expected post operative congestion and edema. He only needed to take the pain medication for 2 days after surgery. The patient has been doing saline irrigations 5-7 times daily, which have been productive of crusts and dried/old blood. He denies excessive bleeding, constant clear fluid from nose, severe headaches, double vision, or fevers.    Operative Report:  Date of Service: 3/1/2024  Location:     Post-op Diagnoses:     * Chronic pansinusitis [J32.4]     * Deviated nasal septum [J34.2]     * Polyp of nasal sinus [J33.8]     * Hypertrophy of nasal turbinates [J34.3]     * Nasal obstruction [J34.89]    Operation/Procedures:  1. Bilateral endoscopic total ethmoidectomies with sphenoidotomies with removal of tissue from sinuses  2. Bilateral endoscopic frontal sinusotomies with frontal sinus explorations - modifier 22  3. Bilateral endoscopic maxillary antrostomies with removal of tissue from sinuses  4. Septoplasty  5. Bilateral inferior turbinate submucous resection  6. Image guidance navigation - extradural     Surgeon: Donnie Eden MD FACS  Assistant: Art Greco DO  EBL: 200 ml  Anesthesia: General and local    Operative Findings:  1. Chronic inflammation through all sinuses, with nasal sinus polyps  2. Absorbable hemostatic material in the ethmoids  3. Barba Splint sutured to the septum bilaterally  4. Propel Contour stents placed in the frontal sinusotomies bilaterally  5. Naturally narrow frontal sinus outflow tracts with extensive polyposis and bleeding resulting in a complex procedure modifier 22.    Per initial note 1/4/2024:  Son Rouse is a 53 y.o. male, referred by my highly esteemed colleague Dr. Mc  MD Maria Isabel. The patient presents with concerns of sinus and nose problems that began at least 1 year ago but worsened approximately 3 months ago. The patient describes his sinus/nose symptoms as #1 nasal obstruction, #2 posterior nasal drainage, #3 anosmia for about 3 months. He was able to walk up to 3 miles/day a couple years ago. This has gradually reduced to 2 miles/day and now to only 1 mile/day as of last year. You endorses yellow nasal drainage in the morning. Patient denies facial pressure, rhinorrhea, facial pain, periorbital edema, epiphora, loss of taste, loss of smell, and epistaxis. The patient has taken fluticasone for greater than 3 months and azelastine to alleviate the problem. He has a history of allergic rhinitis or allergies. In 2018, he tested positive to ragweed, mold spores, oak. He denies any diagnosis of nasal polyps or chronic sinusitis by the allergist in 2018. He is allergic to NSAIDs and has a history of AERD. He takes Symbicort for asthma.     History of prior nasal/sinus surgery or procedure: Denies    Review of Systems   Negative for constitutional, eyes, cardiac, pulmonary, hepatic, renal, digestive, hematologic, epileptic, syncopal, musculoskeletal, mental health, integumentary, hypertensive, lipid, arthritic, diabetic, thyroid or neurologic disorders (except as listed in the HPI, PMH and Problem List).    Assessment   Son Rouse is a 53 y.o. male h/o chronic pansinusitis w NP, right DNS, ITH, nasal obstruction s/p bilateral ESS (total), septoplasty, bilateral ITR. The patient had surgery 3/1/2024.  3/7/24 - 1st post op. Expected post op swelling today. Debridement performed as noted. Rx budesonide irrigations BID x 3 months. Continue saline irrigations.    Plan   Nasal Endoscopy with debridement. Findings: expected post op swelling.  Patient was prescribed budesonide irrigations BID x 3 months. Prescription sent to patient's pharmacy. Patient was instructed to continue nasal  saline irrigations.  Reassurance provided to patient, the nose is healing and edema at this point is expected.  Follow up as scheduled for repeat evaluation    Donnie Eden MD [facility]  Division of Rhinology, Sinus, and Skull Base Surgery       Exam   General: This is a healthy appearing male who appears his stated age. The patient is alert and appropriately verbally conversant without hoarseness.  Face: The face was inspected and no cutaneous masses or lesions were visualized. There was no erythema or edema noted. Facial movement was symmetric without weakness. No skin lesions were detected.  Eyes: Extra-ocular muscle function was intact. No nystagmus was observed. Pupils were equal.    Nose: Examination of the nose revealed the nasal dorsum to be midline. Intranasal exam reveals the septum is healthy without perforation. The inferior turbinates were expectedly edematous. Crusts and dried secretions noted on anterior rhinoscopy. See below procedure note as applicable for further exam.    Procedure Note:  Procedure: Nasal endoscopy with debridement - bilateral  Indication: Chronic rhinosinusitis, post operative from sinus surgery  Informed consent obtained: risks, benefits, alternatives, and expectations discussed with patient and the patient wishes to proceed.    Findings: After anesthesia and decongestion with topical lidocaine and Afrin spray, the nasal cavities were examined and debrided with a zero and/or 30-degree endoscope. Large crusts were taken down from the anterior nasal chambers with a straight Blakesley forceps. Crusts were debrided and removed from the middle meatus and ethmoid cavity on the right and left. Sphenoidotomies are clear after debridement. The maxillary and ethmoid sinuses are widely patent. The frontal sinusotomies anatomically narrow and were clear. Propel stents are present and patent bilaterally. No pus or polyps were noted. There is no CSF leak. Things are healing well. The patient  tolerated the procedure well and there were no complications.       Scribe Attestation  By signing my name below, I, Lopez Valencia, attest that this documentation has been prepared under the direction and in the presence of Donnie Eden MD. All medical record entries made by the Scribe were at my direction or personally dictated by me. I have reviewed the chart and agree that the record accurately reflects my personal performance of the history, physical exam, discussion and plan.

## 2024-03-07 NOTE — PATIENT INSTRUCTIONS
PATIENT INSTRUCTIONS ARE COMPLETE and READY TO PRINT    Please observe the following post-op precautions for 2 weeks from the date of your surgery (until March 15):  Please refrain from blowing your nose. Please do not stifle any sneezes. If you must sneeze, try to sneeze through an open mouth. Please do not stick anything in your nose other than any medicine or irrigations we recommend. Please do not insert a Q-tip or finger in the nose because this will cause further trauma. Please refrain from any activities that will increase your heart rate or blood pressure. Please do not exercise or do any strenuous activities. Please refrain from lifting objects greater than 10 lbs. Try to keep your head above your heart as much as possible. Please avoid all traveling outside your local area during this 2-week precaution period.    Please start doing saline and budseonide irrigations as noted below:  In the morning, do a saline irrigation followed by a budesonide irrigation.  In the evening, do a saline irrigation followed by a budesonide irrigation.  To prepare a budesonide irrigation: Add 1 NeilMed salt packet to 8 ounces of distilled water, or use the recipe below to make your own saline solution. Shake to dissolve. Then, add 1 budesonide vial to the saline solution. Swirl gently to avoid deactivating the budesonide. Use half of the mixture (4 ounces) in each nostril.  Make sure the budesonide irrigation is the last irrigation you do for several hours and the last irrigation before going to sleep. This allows the budesonide to coat your nasal cavity.  Please contact our office at [phone] if you are have problems obtaining the budesonide or if it is too expensive through your regular pharmacy. We can instead prescribe it through a compounding pharmacy to reduce the cost significantly.    Recipe to Make your Own Nasal Saline Solution:  Mix 8 ounces of distilled water or tap water (be sure to boil tap water, then let it  cool down) with 1/2 teaspoon of baking soda and 1/2 teaspoon of table salt and shake bottle to dissolve.    Follow up:  Please follow up with me on Thursday, March 21, 2024 at 10:30 am for your second postoperative visit. Please feel free to contact my office by calling [phone] with any questions.    Irrigation Technique:  Stand with your head forward over the sink or in the bathtub to prevent water from going down into your throat. The goal is to get the irrigation back out of the opposite nostril after irrigation. Irrigate each side of the nose with 4 ounces (about 120 mL) of the solution. You may buy the salt packets that come with the NeilMed irrigation bottle or use the recipe provided above to make your own nasal saline. Irrigate each side of the nose with mild force/squeezing of the bottle. If you feel like the solution is going into your ears, adjust your head angle or use less force with the bottle. The first couple times you use the solution, your nose may burn a bit, but this will go away with time.    Scribe Attestation  By signing my name below, I, Lopez Valencia, attest that this documentation has been prepared under the direction and in the presence of Donnie Eden MD. All medical record entries made by the Scribe were at my direction or personally dictated by me. I have reviewed the chart and agree that the record accurately reflects my personal performance of the history, physical exam, discussion and plan.

## 2024-03-19 PROCEDURE — 88312 SPECIAL STAINS GROUP 1: CPT | Mod: TC,SUR | Performed by: OTOLARYNGOLOGY

## 2024-03-19 PROCEDURE — 88312 SPECIAL STAINS GROUP 1: CPT | Performed by: PATHOLOGY

## 2024-03-19 NOTE — PROGRESS NOTES
HPI   Son Rouse is a 53 y.o. old male h/o chronic pansinusitis w NP, right DNS, ITH, nasal obstruction s/p bilateral ESS (total), septoplasty, bilateral ITR. The patient had surgery 3/1/2024.  3/7/24 - Patient presents for his 1st post op visit. He reports no acute concerns. He reports expected post operative congestion and edema. He only needed to take the pain medication for 2 days after surgery. The patient has been doing saline irrigations 5-7 times daily, which have been productive of crusts and dried/old blood. He denies excessive bleeding, constant clear fluid from nose, severe headaches, double vision, or fevers.  3/21/24 - Patient presents for his 2nd post op visit. He reports doing very well. He is happy to be able to breathe through his nose. He was able to smell coffee in the first time in over 1 year. He reports increased congestion since the last visit. He describes the congestion as having to blow his nose frequently. He gets occasional blood tinged drainage from the nose. His rinses have been clear. He is irrigating twice daily with budesonide. He will do a third of just saline. He denies excessive bleeding, constant clear fluid from nose, severe headaches, double vision, or fevers.    Operative Report:  Date of Service: 3/1/2024  Location:     Post-op Diagnoses:  * Chronic pansinusitis [J32.4]  * Deviated nasal septum [J34.2]  * Polyp of nasal sinus [J33.8]  * Hypertrophy of nasal turbinates [J34.3]  * Nasal obstruction [J34.89]    Operation/Procedures:  1. Bilateral endoscopic total ethmoidectomies with sphenoidotomies with removal of tissue from sinuses  2. Bilateral endoscopic frontal sinusotomies with frontal sinus explorations - modifier 22  3. Bilateral endoscopic maxillary antrostomies with removal of tissue from sinuses  4. Septoplasty  5. Bilateral inferior turbinate submucous resection  6. Image guidance navigation - extradural     Surgeon: Donnie Eden MD FACS  Assistant: Art Greco  DO  EBL: 200 ml  Anesthesia: General and local    Operative Findings:  1. Chronic inflammation through all sinuses, with nasal sinus polyps  2. Absorbable hemostatic material in the ethmoids  3. Barba Splint sutured to the septum bilaterally  4. Propel Contour stents placed in the frontal sinusotomies bilaterally  5. Naturally narrow frontal sinus outflow tracts with extensive polyposis and bleeding resulting in a complex procedure modifier 22.    Per initial note 1/4/2024:  Son Rouse is a 53 y.o. male, referred by my highly esteemed colleague Dr. Alexandro Nicolas MD. The patient presents with concerns of sinus and nose problems that began at least 1 year ago but worsened approximately 3 months ago. The patient describes his sinus/nose symptoms as #1 nasal obstruction, #2 posterior nasal drainage, #3 anosmia for about 3 months. He was able to walk up to 3 miles/day a couple years ago. This has gradually reduced to 2 miles/day and now to only 1 mile/day as of last year. You endorses yellow nasal drainage in the morning. Patient denies facial pressure, rhinorrhea, facial pain, periorbital edema, epiphora, loss of taste, loss of smell, and epistaxis. The patient has taken fluticasone for greater than 3 months and azelastine to alleviate the problem. He has a history of allergic rhinitis or allergies. In 2018, he tested positive to ragweed, mold spores, oak. He denies any diagnosis of nasal polyps or chronic sinusitis by the allergist in 2018. He is allergic to NSAIDs and has a history of AERD. He takes Symbicort for asthma.     History of prior nasal/sinus surgery or procedure: Denies    Review of Systems   Negative for constitutional, eyes, cardiac, pulmonary, hepatic, renal, digestive, hematologic, epileptic, syncopal, musculoskeletal, mental health, integumentary, hypertensive, lipid, arthritic, diabetic, thyroid or neurologic disorders (except as listed in the HPI, PMH and Problem List).    Assessment    Son Rouse is a 53 y.o. male h/o chronic pansinusitis w NP, right DNS, ITH, nasal obstruction s/p bilateral ESS (total), septoplasty, bilateral ITR. The patient had surgery 3/1/2024.  3/7/24 - 1st post op. Expected post op swelling today. Debridement performed as noted. Rx budesonide irrigations BID x 3 months. Continue saline irrigations.  3/21/24 - 2nd post op. Doing well. Breathing well. Some smell has come back. Frontals patent today, naturally narrow. Debridement performed as noted. Continue budesonide irrigations BID. Rec nasal moisturization.    Plan   Nasal endoscopy with debridement. Findings: as noted.  Continue budesonide irrigations BID. Patient was instructed to continue nasal saline irrigations.  Patient was instructed to start nasal moisturization with antibiotic ointment, saline gel, or Vaseline twice daily. Patient was instructed to swipe a pea-sized amount into anterior nares and sniff back. Patient was instructed to avoid applying it with Q-tips and use it after any nasal sprays or irrigations.  Reassurance provided to patient, the nose is healing and edema at this point is expected.  If the patient develops thick yellow green drainage or facial pressure, he was instructed to start doing nasal saline irrigations twice daily. If he continues to have thick yellow green drainage or facial pressure for more than 4-5 days, he was instructed to call our office for an antibiotic.  Follow up in 6 weeks for repeat evaluation.    Donnie Eden MD [facility]  Division of Rhinology, Sinus, and Skull Base Surgery       Exam   General: This is a healthy appearing male who appears his stated age. The patient is alert and appropriately verbally conversant without hoarseness.  Face: The face was inspected and no cutaneous masses or lesions were visualized. There was no erythema or edema noted. Facial movement was symmetric without weakness. No skin lesions were detected.  Eyes: Extra-ocular muscle function was  intact. No nystagmus was observed. Pupils were equal.    Nose: Examination of the nose revealed the nasal dorsum to be midline. Intranasal exam reveals the septum is healthy without perforation. The inferior turbinates were expectedly edematous. Crusts and dried secretions noted on anterior rhinoscopy. See below procedure note as applicable for further exam.    Procedure Note:  Procedure: Nasal endoscopy with debridement - bilateral  Indication: Chronic rhinosinusitis, post operative from sinus surgery  Informed consent obtained: risks, benefits, alternatives, and expectations discussed with patient and the patient wishes to proceed.    Findings: After anesthesia and decongestion with topical lidocaine and Afrin spray, the nasal cavities were examined and debrided with a zero and/or 30-degree endoscope. Large adherent crusts along the posterior maxillary antrostomies bilaterally which were not ready for debridement. Sphenoidotomies are clear. The maxillary and ethmoid sinuses are widely patent. The frontal sinusotomies anatomically narrow and propel stents were removed as partially dissolving but there was minimal debrided bilaterally with a suction. No pus or polyps were noted. There is no CSF leak. Things are healing well. The patient tolerated the procedure well and there were no complications.       Scribe Attestation  By signing my name below, I, Lopez Valencia, attest that this documentation has been prepared under the direction and in the presence of Donnie Eden MD. All medical record entries made by the Scribe were at my direction or personally dictated by me. I have reviewed the chart and agree that the record accurately reflects my personal performance of the history, physical exam, discussion and plan.

## 2024-03-19 NOTE — PATIENT INSTRUCTIONS
PATIENT INSTRUCTIONS ARE COMPLETE and READY TO PRINT    Continue doing saline and budesonide irrigations as noted:  In the morning, do a saline irrigation followed by a budesonide irrigation. Moisturize your nose afterward as noted below.  In the evening, do a saline irrigation followed by a budesonide irrigation. Moisturize your nose afterward as noted below.  To prepare a budesonide irrigation: Add 1 NeilMed salt packet to 8 ounces of distilled water, or use the recipe below to make your own saline solution. Shake to dissolve. Then, add 1 budesonide capsule to the saline solution. Use half of the mixture (4 ounces) in each nostril.  Make sure the budesonide irrigation is the last irrigation you do for several hours and the last irrigation before going to sleep. This allows the budesonide to coat your nasal cavity.    Recipe to Make your Own Nasal Saline Solution:  Mix 8 ounces of distilled water or tap water (be sure to boil tap water, then let it cool down) with 1/2 teaspoon of baking soda and 1/2 teaspoon of table salt and shake bottle to dissolve.    Nasal moisturization:  Please start using antibiotic ointment, nasal saline gel (such as Ayr saline gel), or Vaseline in your nose. Use it twice daily after nasal irrigations.  Apply a pea-sized amount to the pad of your finger. Then, swipe it into the front of your nostril and sniff back gently. Repeat this in the opposite nostril.  If you get saline gel in a spray bottle, lift the outside of your nostril and spray directly at the center of your nose. After spraying, gently sniff back and pinch your nose.  Please do not use a Q-tip, fingernail, or rub your finger in your nose because this causes irritation.  Moisturizing your nose will help prevent nasal dryness and is especially beneficial during the winter season.    Sinus infection:  If you develop copious thick yellow green drainage or facial pressure, please start doing nasal saline irrigations twice daily  for about 5 days. This can sometimes prevent a full-blown sinus infection from developing. If you continue to have thick yellow green drainage or facial pressure for more than 4-5 days, please contact our office by calling [phone] for an antibiotic.    Follow up:  Please follow up with me as discussed. Please feel free to contact my office at [phone] with any questions.    Scribe Attestation  By signing my name below, I, Lopez Valencia, attest that this documentation has been prepared under the direction and in the presence of Donnie Eden MD. All medical record entries made by the Scribe were at my direction or personally dictated by me. I have reviewed the chart and agree that the record accurately reflects my personal performance of the history, physical exam, discussion and plan.

## 2024-03-21 ENCOUNTER — OFFICE VISIT (OUTPATIENT)
Dept: OTOLARYNGOLOGY | Facility: CLINIC | Age: 54
End: 2024-03-21
Payer: COMMERCIAL

## 2024-03-21 VITALS — HEIGHT: 70 IN | TEMPERATURE: 97 F | WEIGHT: 252.2 LBS | BODY MASS INDEX: 36.11 KG/M2

## 2024-03-21 DIAGNOSIS — J34.89 NASAL CRUSTING: ICD-10-CM

## 2024-03-21 DIAGNOSIS — J32.4 CHRONIC PANSINUSITIS: Primary | ICD-10-CM

## 2024-03-21 DIAGNOSIS — J33.8 POLYP OF NASAL SINUS: ICD-10-CM

## 2024-03-21 PROCEDURE — 31237 NSL/SINS NDSC SURG BX POLYPC: CPT | Performed by: OTOLARYNGOLOGY

## 2024-03-21 PROCEDURE — 99024 POSTOP FOLLOW-UP VISIT: CPT | Performed by: OTOLARYNGOLOGY

## 2024-03-21 ASSESSMENT — PATIENT HEALTH QUESTIONNAIRE - PHQ9
SUM OF ALL RESPONSES TO PHQ9 QUESTIONS 1 AND 2: 0
1. LITTLE INTEREST OR PLEASURE IN DOING THINGS: NOT AT ALL
2. FEELING DOWN, DEPRESSED OR HOPELESS: NOT AT ALL

## 2024-04-12 LAB
LABORATORY COMMENT REPORT: NORMAL
PATH REPORT.ADDENDUM SPEC: NORMAL
PATH REPORT.FINAL DX SPEC: NORMAL
PATH REPORT.GROSS SPEC: NORMAL
PATH REPORT.RELEVANT HX SPEC: NORMAL
PATH REPORT.TOTAL CANCER: NORMAL

## 2024-05-13 NOTE — PROGRESS NOTES
Sinus & Skull Base Surgery    [facility]   3/7/24 - Patient presents for his 1st post op visit. He reports no acute concerns. He reports expected post operative congestion and edema. He only needed to take the pain medication for 2 days after surgery. The patient has been doing saline irrigations 5-7 times daily, which have been productive of crusts and dried/old blood. He denies excessive bleeding, constant clear fluid from nose, severe headaches, double vision, or fevers.  3/21/24 - Patient presents for his 2nd post op visit. He reports doing very well. He is happy to be able to breathe through his nose. He was able to smell coffee in the first time in over 1 year. He reports increased congestion since the last visit. He describes the congestion as having to blow his nose frequently. He gets occasional blood tinged drainage from the nose. His rinses have been clear. He is irrigating twice daily with budesonide. He will do a third of just saline. He denies excessive bleeding, constant clear fluid from nose, severe headaches, double vision, or fevers.  5/16/24 - Patient presents for followup. He reports doing well. He is breathing well through the nose and is starting to smell things again. He does get occasional hardened mucous that sometimes wants to go down his throat. He is performing budesonide irrigations twice daily.        Operation/Procedures:  1. Bilateral endoscopic total ethmoidectomies with sphenoidotomies with removal of tissue from sinuses  2. Bilateral endoscopic frontal sinusotomies with frontal sinus explorations - modifier 22  3. Bilateral endoscopic maxillary antrostomies with removal of tissue from sinuses  4. Septoplasty  5. Bilateral inferior turbinate submucous resection  6. Image guidance navigation - extradural     Surgeon: Donnie Eden MD FACS  Assistant: Art Greco DO  EBL: 200 ml  Anesthesia: General and local    Operative Findings:  1. Chronic inflammation through all sinuses, with nasal sinus  polyps  2. Absorbable hemostatic material in the ethmoids  3. Barba Splint sutured to the septum bilaterally  4. Propel Contour stents placed in the frontal sinusotomies bilaterally  5. Naturally narrow frontal sinus outflow tracts with extensive polyposis and bleeding resulting in a complex procedure modifier 22.    Per initial note 1/4/2024:  Son Rouse is a 53 y.o. male, referred by my highly esteemed colleague Dr. Alexandro Nicolas MD. The patient presents with concerns of sinus and nose problems that began at least 1 year ago but worsened approximately 3 months ago. The patient describes his sinus/nose symptoms as #1 nasal obstruction, #2 posterior nasal drainage, #3 anosmia for about 3 months. He was able to walk up to 3 miles/day a couple years ago. This has gradually reduced to 2 miles/day and now to only 1 mile/day as of last year. You endorses yellow nasal drainage in the morning. Patient denies facial pressure, rhinorrhea, facial pain, periorbital edema, epiphora, loss of taste, loss of smell, and epistaxis. The patient has taken fluticasone for greater than 3 months and azelastine to alleviate the problem. He has a history of allergic rhinitis or allergies. In 2018, he tested positive to ragweed, mold spores, oak. He denies any diagnosis of nasal polyps or chronic sinusitis by the allergist in 2018. He is allergic to NSAIDs and has a history of AERD. He takes Symbicort for asthma.     History of prior nasal/sinus surgery or procedure: Denies    Review of Systems   Negative for constitutional, eyes, cardiac, pulmonary, hepatic, renal, digestive, hematologic, epileptic, syncopal, musculoskeletal, mental health, integumentary, hypertensive, lipid, arthritic, diabetic, thyroid or neurologic disorders (except as listed in the HPI, PMH and Problem List).    Assessment   Son Rouse is a 53 y.o. male h/o chronic pansinusitis w NP, right DNS, ITH, nasal obstruction s/p bilateral ESS (total),  septoplasty, bilateral ITR. The patient had surgery 3/1/2024.  3/7/24 - 1st post op. Expected post op swelling today. Debridement performed as noted. Rx budesonide irrigations BID x 3 months. Continue saline irrigations.  3/21/24 - 2nd post op. Doing well. Breathing well. Some smell has come back. Frontals patent today, naturally narrow. Debridement performed as noted. Continue budesonide irrigations BID. Rec nasal moisturization.  5/16/24 - Sinuses PRISTINE, except for edema of the left maxillary sinus. Debridement performed. Continue budesonide irrigations BID.    Plan   Nasal endoscopy with debridement. Findings: as noted.  Patient was instructed to continue budesonide irrigations BID and continue nasal saline irrigations.  Patient may continue nasal moisturization with antibiotic ointment, saline gel, or Vaseline twice daily. Patient was instructed to swipe a pea-sized amount into anterior nares and sniff back. Patient was instructed to avoid applying it with Q-tips and use it after any nasal sprays or irrigations.  Reassurance provided to patient. The nose is healing well, and edema at this point is expected.  If the patient develops thick yellow green drainage or facial pressure, he was instructed to start doing nasal saline irrigations twice daily. If he continues to have thick yellow green drainage or facial pressure for more than 4-5 days, he was instructed to call our office for an antibiotic.  Follow up in 2 months for repeat evaluation.    Donnie Eden MD [facility]  Division of Rhinology, Sinus, and Skull Base Surgery       Exam   General: This is a healthy appearing male who appears his stated age. The patient is alert and appropriately verbally conversant without hoarseness.  Face: The face was inspected and no cutaneous masses or lesions were visualized. There was no erythema or edema noted. Facial movement was symmetric without weakness. No skin lesions were detected.  Eyes: Extra-ocular muscle function  was intact. No nystagmus was observed. Pupils were equal.    Nose: Examination of the nose revealed the nasal dorsum to be midline. Intranasal exam reveals the septum is healthy without perforation. The inferior turbinates were expectedly edematous. Crusts and dried secretions noted on anterior rhinoscopy. See below procedure note as applicable for further exam.    Procedure Note:  Procedure: Nasal endoscopy with debridement - bilateral  Indication: Chronic rhinosinusitis, post operative from sinus surgery  Informed consent obtained: risks, benefits, alternatives, and expectations discussed with patient and the patient wishes to proceed.    Findings: After anesthesia and decongestion with topical lidocaine and Afrin spray, the nasal cavities were examined and debrided with a zero and/or 30-degree endoscope. Small adherent crusts along the posterior maxillary antrostomies bilaterally which were debrided on the left and the right. Sphenoidotomies are clear. The maxillary and ethmoid sinuses are widely patent. The left maxillary sinus does have some moderate edema. The frontal sinusotomies anatomically narrow but are widely patent. No pus or polyps were noted. There is no CSF leak. Things are healing well. Otherwise the inferior, middle, superior turbinates and meatuses, sphenoethmoid recess, nasopharynx, nasal cavity and septum were all normal.The patient tolerated the procedure well and there were no complications.       Scribe Attestation  By signing my name below, I, Lopez Valencia, attest that this documentation has been prepared under the direction and in the presence of Donnie Eden MD. All medical record entries made by the Scribe were at my direction or personally dictated by me. I have reviewed the chart and agree that the record accurately reflects my personal performance of the history, physical exam, discussion and plan.

## 2024-05-13 NOTE — PATIENT INSTRUCTIONS
PATIENT INSTRUCTIONS ARE COMPLETE and READY TO PRINT    Budesonide irrigations:  Please continue doing budesonide irrigations twice daily.  Your sinuses look good except for some edema left maxillary (cheek) sinus. I recommend tilting your head after doing a budesonide irrigation to get the solution to coat your left cheek sinus.  To prepare a budesonide irrigation: Add 1 salt/baking soda packet to 8 ounces of distilled water, or use the recipe below to make your own saline solution. Shake to dissolve. Then, add 1 budesonide capsule to the saline solution. Use half of the mixture (4 ounces) in each nostril.  Make sure that the budesonide irrigation is the last irrigation you do for at least several hours and the last irrigation before going to sleep. This allows the budesonide to coat your nasal cavity.    Recipe to Make your Own Nasal Saline Solution:  Mix 8 ounces of distilled water or tap water (be sure to boil tap water, then let it cool down) with 1/2 teaspoon of baking soda and 1/2 teaspoon of table salt and shake bottle to dissolve.    Nasal moisturization:  Please continue using antibiotic ointment, nasal saline gel (such as Ayr saline gel), or Vaseline in your nose. Use it after nasal irrigations.  If you get it in a tube, apply a pea-sized amount to the pad of your finger. Then, swipe it into the front of your nostril and sniff back gently. Repeat this in the opposite nostril.  If you get saline gel in a spray bottle, lift the outside of your nostril and spray directly at the center of your nose. After spraying, gently sniff back and pinch your nose.  Please do not use a Q-tip, fingernail, or rub your finger in your nose because this causes irritation.  Moisturizing your nose will prevent nasal dryness and is especially beneficial during the winter season.    Sinus infection:  If you develop copious thick yellow green drainage or facial pressure, please start doing nasal saline irrigations twice daily for  about 5 days. This can sometimes prevent a full-blown sinus infection from developing. If you continue to have thick yellow green drainage or facial pressure for more than 4-5 days, please contact our office by calling [phone] for an antibiotic.    Follow up:  Please follow up with me in 2 months for reevaluation or sooner with any questions or concerns. Please feel free to contact my office at [phone] with any questions.    Scribe Attestation  By signing my name below, I, Lopez Valencia, attest that this documentation has been prepared under the direction and in the presence of Donnei Eden MD. All medical record entries made by the Scribe were at my direction or personally dictated by me. I have reviewed the chart and agree that the record accurately reflects my personal performance of the history, physical exam, discussion and plan.

## 2024-05-16 ENCOUNTER — OFFICE VISIT (OUTPATIENT)
Dept: OTOLARYNGOLOGY | Facility: CLINIC | Age: 54
End: 2024-05-16
Payer: COMMERCIAL

## 2024-05-16 VITALS — TEMPERATURE: 97.7 F | BODY MASS INDEX: 38.24 KG/M2 | WEIGHT: 267.1 LBS | HEIGHT: 70 IN

## 2024-05-16 DIAGNOSIS — J32.4 CHRONIC PANSINUSITIS: Primary | ICD-10-CM

## 2024-05-16 DIAGNOSIS — J33.8 POLYP OF NASAL SINUS: ICD-10-CM

## 2024-05-16 PROCEDURE — 99024 POSTOP FOLLOW-UP VISIT: CPT | Performed by: OTOLARYNGOLOGY

## 2024-05-16 PROCEDURE — 31237 NSL/SINS NDSC SURG BX POLYPC: CPT | Performed by: OTOLARYNGOLOGY

## 2024-05-16 ASSESSMENT — PATIENT HEALTH QUESTIONNAIRE - PHQ9
2. FEELING DOWN, DEPRESSED OR HOPELESS: NOT AT ALL
SUM OF ALL RESPONSES TO PHQ9 QUESTIONS 1 AND 2: 0
1. LITTLE INTEREST OR PLEASURE IN DOING THINGS: NOT AT ALL

## 2024-07-03 ENCOUNTER — APPOINTMENT (OUTPATIENT)
Dept: PRIMARY CARE | Facility: CLINIC | Age: 54
End: 2024-07-03
Payer: COMMERCIAL

## 2024-07-03 VITALS
BODY MASS INDEX: 39.2 KG/M2 | DIASTOLIC BLOOD PRESSURE: 92 MMHG | HEART RATE: 90 BPM | OXYGEN SATURATION: 94 % | WEIGHT: 273.8 LBS | SYSTOLIC BLOOD PRESSURE: 140 MMHG | HEIGHT: 70 IN

## 2024-07-03 DIAGNOSIS — I10 PRIMARY HYPERTENSION: ICD-10-CM

## 2024-07-03 DIAGNOSIS — E66.09 CLASS 2 OBESITY DUE TO EXCESS CALORIES WITHOUT SERIOUS COMORBIDITY WITH BODY MASS INDEX (BMI) OF 39.0 TO 39.9 IN ADULT: ICD-10-CM

## 2024-07-03 DIAGNOSIS — J45.20 MILD INTERMITTENT REACTIVE AIRWAY DISEASE WITHOUT COMPLICATION (HHS-HCC): Primary | ICD-10-CM

## 2024-07-03 PROCEDURE — 3077F SYST BP >= 140 MM HG: CPT | Performed by: STUDENT IN AN ORGANIZED HEALTH CARE EDUCATION/TRAINING PROGRAM

## 2024-07-03 PROCEDURE — 3080F DIAST BP >= 90 MM HG: CPT | Performed by: STUDENT IN AN ORGANIZED HEALTH CARE EDUCATION/TRAINING PROGRAM

## 2024-07-03 PROCEDURE — RXMED WILLOW AMBULATORY MEDICATION CHARGE

## 2024-07-03 PROCEDURE — 3008F BODY MASS INDEX DOCD: CPT | Performed by: STUDENT IN AN ORGANIZED HEALTH CARE EDUCATION/TRAINING PROGRAM

## 2024-07-03 PROCEDURE — 99214 OFFICE O/P EST MOD 30 MIN: CPT | Performed by: STUDENT IN AN ORGANIZED HEALTH CARE EDUCATION/TRAINING PROGRAM

## 2024-07-03 RX ORDER — BUDESONIDE AND FORMOTEROL FUMARATE DIHYDRATE 80; 4.5 UG/1; UG/1
2 AEROSOL RESPIRATORY (INHALATION) 2 TIMES DAILY
Qty: 30.6 G | Refills: 1 | Status: SHIPPED | OUTPATIENT
Start: 2024-07-03 | End: 2025-07-03

## 2024-07-03 NOTE — PROGRESS NOTES
Subjective   Patient ID: Son Rouse is a 53 y.o. male who presents for Follow-up (Pt is here for a 6 month follow up. /76.).    HPI   Pt is s/p Bilateral endoscopic total ethmoidectomies with sphenoidotomies with removal of tissue from sinuses, frontal sinusotomies with frontal sinus explorations, bilateral endoscopic maxillary antrostomies with removal of tissue from sinuses, septoplasty, bilateral inferior turbinate submucous resection extradural with Dr. Eden on 3/1/24  Anomia has resolved since his surgery.     Has stopped chewing nicotine on 1/1/26    Doing some stress relief   In January he was 245 lbs, noticed increased eating since         Objective   Physical Exam  Vitals reviewed.   Constitutional:       Appearance: Normal appearance.   Cardiovascular:      Rate and Rhythm: Normal rate and regular rhythm.      Heart sounds: No murmur heard.  Pulmonary:      Effort: Pulmonary effort is normal. No respiratory distress.      Breath sounds: Normal breath sounds. No wheezing.   Musculoskeletal:      Cervical back: Neck supple.      Left lower leg: No edema.   Neurological:      Mental Status: He is alert.         Assessment/Plan   Diagnoses and all orders for this visit:  Mild intermittent reactive airway disease without complication ([facility])  -     budesonide-formoteroL (Symbicort) 80-4.5 mcg/actuation inhaler; Inhale 2 puffs 2 times a day.  Primary hypertension  -     CBC and Auto Differential; Future  -     Comprehensive metabolic panel; Future  -     Hemoglobin A1C; Future  -     Tsh With Reflex To Free T4 If Abnormal; Future  -     Vitamin D 25-Hydroxy,Total (for eval of Vitamin D levels); Future  Class 2 obesity due to excess calories without serious comorbidity with body mass index (BMI) of 39.0 to 39.9 in adult  -     CBC and Auto Differential; Future  -     Comprehensive metabolic panel; Future  -     Hemoglobin A1C; Future  -     Tsh With Reflex To Free T4 If Abnormal; Future  -      Vitamin D 25-Hydroxy,Total (for eval of Vitamin D levels); Future    Son Rouse is a 53 y.o. male presents for BP check      BP  140/92 mmHg   Has been attempting lifestyle changes for BP.   Has been noted previously   Very stressful work environment   CT calcium score 2019: 27 low risk   Weight gain noted   Fu in 4 months for fu      Reactive Airway Disease   Refill Symbicort 1 puff daily   Night time none  Albuterol PRN twice a month   Intermittently using flonase        Colonoscopy 2019: 11 year denny Cruz DO 07/03/24 7:41 AM

## 2024-07-03 NOTE — PATIENT INSTRUCTIONS
For blood pressure, our goal is under 140/90 mmHg. Please track a couple of times a week before your next visit.     Aim for 150 min of exercise a week.

## 2024-07-15 NOTE — PROGRESS NOTES
Sinus & Skull Base Surgery    [facility]   3/7/24 - Patient presents for his 1st post op visit. He reports no acute concerns. He reports expected post operative congestion and edema. He only needed to take the pain medication for 2 days after surgery. The patient has been doing saline irrigations 5-7 times daily, which have been productive of crusts and dried/old blood. He denies excessive bleeding, constant clear fluid from nose, severe headaches, double vision, or fevers.  3/21/24 - Patient presents for his 2nd post op visit. He reports doing very well. He is happy to be able to breathe through his nose. He was able to smell coffee in the first time in over 1 year. He reports increased congestion since the last visit. He describes the congestion as having to blow his nose frequently. He gets occasional blood tinged drainage from the nose. His rinses have been clear. He is irrigating twice daily with budesonide. He will do a third of just saline. He denies excessive bleeding, constant clear fluid from nose, severe headaches, double vision, or fevers.  5/16/24 - Patient presents for followup. He reports doing well. He is breathing well through the nose and is starting to smell things again. He does get occasional hardened mucous that sometimes wants to go down his throat. He is performing budesonide irrigations twice daily.  7/18/24 - Patient presents for a followup visit. He reports doing well with the sinuses/nose. Approximately every 4 days, he reports a large crust on the left side. He is performing budesonide irrigations once daily.    Operation/Procedures:  1. Bilateral endoscopic total ethmoidectomies with sphenoidotomies with removal of tissue from sinuses  2. Bilateral endoscopic frontal sinusotomies with frontal sinus explorations - modifier 22  3. Bilateral endoscopic maxillary antrostomies with removal of tissue from sinuses  4. Septoplasty  5. Bilateral inferior turbinate submucous resection  6. Image  guidance navigation - extradural     Surgeon: Donnie Eden MD FACS  Assistant: Art Greco DO  EBL: 200 ml  Anesthesia: General and local    Operative Findings:  1. Chronic inflammation through all sinuses, with nasal sinus polyps  2. Absorbable hemostatic material in the ethmoids  3. Barba Splint sutured to the septum bilaterally  4. Propel Contour stents placed in the frontal sinusotomies bilaterally  5. Naturally narrow frontal sinus outflow tracts with extensive polyposis and bleeding resulting in a complex procedure modifier 22.    Per initial note 1/4/2024:  Son Rouse is a 53 y.o. male, referred by my highly esteemed colleague Dr. Alexandro Nicolas MD. The patient presents with concerns of sinus and nose problems that began at least 1 year ago but worsened approximately 3 months ago. The patient describes his sinus/nose symptoms as #1 nasal obstruction, #2 posterior nasal drainage, #3 anosmia for about 3 months. He was able to walk up to 3 miles/day a couple years ago. This has gradually reduced to 2 miles/day and now to only 1 mile/day as of last year. You endorses yellow nasal drainage in the morning. Patient denies facial pressure, rhinorrhea, facial pain, periorbital edema, epiphora, loss of taste, loss of smell, and epistaxis. The patient has taken fluticasone for greater than 3 months and azelastine to alleviate the problem. He has a history of allergic rhinitis or allergies. In 2018, he tested positive to ragweed, mold spores, oak. He denies any diagnosis of nasal polyps or chronic sinusitis by the allergist in 2018. He is allergic to NSAIDs and has a history of AERD. He takes Symbicort for asthma.    History of prior nasal/sinus surgery or procedure: Denies  History of Prediabetes  Allergies: Nsaids (non-steroidal anti-inflammatory drug)    Review of Systems   Negative for constitutional, eyes, cardiac, pulmonary, hepatic, renal, digestive, hematologic, epileptic, syncopal, musculoskeletal, mental  health, integumentary, hypertensive, lipid, arthritic, diabetic, thyroid or neurologic disorders (except as listed in the HPI, PMH and Problem List).    Assessment   Son Rouse is a 53 y.o. male h/o chronic pansinusitis w NP, right DNS, ITH, nasal obstruction s/p bilateral ESS (total), septoplasty, bilateral ITR. The patient had surgery 3/1/2024.  3/7/24 - 1st post op. Expected post op swelling today. Debridement performed as noted. Rx budesonide irrigations BID x 3 months. Continue saline irrigations.  3/21/24 - 2nd post op. Doing well. Breathing well. Some smell has come back. Frontals patent today, naturally narrow. Debridement performed as noted. Continue budesonide irrigations BID. Rec nasal moisturization.  5/16/24 - Sinuses PRISTINE, except for edema of the left maxillary sinus. Debridement performed. Continue budesonide irrigations BID.  7/18/24 - Left sinuses PRISTINE. Right side with low grade infection crusting and edema in ethmoids. Rx doxy x 10 days. Rx Medrol Dosepak. Longer term would like to start Rx Xhance. Increase budseonide irrig to BID until finished.    Plan   Nasal endoscopy. Findings: as noted.  Patient was prescribed a 10-day course of doxycycline 100 mg BID. Patient was advised to take the antibiotic after meals, avoid sun exposure or apply strong sunscreen (SPF-50 or higher) when in the sun, avoid dairy/calcium/iron within 2 hours of taking the antibiotic, and to consume a daily yogurt or a probiotic in the middle of the day.  Patient was prescribed a Medrol Dosepak. The potential side effects of oral steroid use were discussed at length with the patient. These risks include but are not limited to: difficulty sleeping/insomnia, increased appetite, fluid retention, mood swings, weight gain, change in blood pressure, high blood glucose, possible adrenal suppression, osteoporosis, avascular necrosis of the hip, glaucoma, and cataracts. The patient understands these risks and is willing  to proceed with oral steroid therapy. Patient was advised to discontinue prednisone if his blood sugar levels go consistently above 200 mg/dL.  Patient was instructed to Increase budesonide irrigations to twice daily and continue nasal saline irrigations.  Patient was prescribed Xhance, 1 spray in each nostril BID. If Xhance is not approved by his insurance, he was instructed to use Flonase, 2 sprays in each nostril BID.  Patient may continue nasal moisturization with antibiotic ointment, saline gel, or Vaseline twice daily. Patient was instructed to swipe a pea-sized amount into anterior nares and sniff back. Patient was instructed to avoid applying it with Q-tips and use it after any nasal sprays or irrigations.  Reassurance provided to patient. The nose has healing well.  If the patient develops thick yellow green drainage or facial pressure, he was instructed to start doing nasal saline irrigations twice daily. If he continues to have thick yellow green drainage or facial pressure for more than 4-5 days, he was instructed to call our office for an antibiotic.  Follow up with me in 3 months.    Donnie Eden MD [facility]  Division of Rhinology, Sinus, and Skull Base Surgery       Exam   General: This is a healthy appearing male who appears his stated age. The patient is alert and appropriately verbally conversant without hoarseness.  Face: The face was inspected and no cutaneous masses or lesions were visualized. There was no erythema or edema noted. Facial movement was symmetric without weakness. No skin lesions were detected.  Eyes: Extra-ocular muscle function was intact. No nystagmus was observed. Pupils were equal.    Nose: Examination of the nose revealed the nasal dorsum to be midline. Intranasal exam reveals the septum is healthy without perforation. The inferior turbinates were expectedly edematous. Crusts and dried secretions noted on anterior rhinoscopy. See below procedure note as applicable for further  exam.    Procedure Note:  Procedure: Nasal endoscopy - diagnostic left, debridement right  Indication: Chronic rhinosinusitis, post operative from sinus surgery  Informed consent obtained: risks, benefits, alternatives, and expectations discussed with patient and the patient wishes to proceed.    Findings: After anesthesia and decongestion with topical lidocaine and Afrin spray, the nasal cavities were examined and debrided with a zero and/or 30-degree endoscope. Small adherent crusts along the posterior maxillary antrostomies bilaterally which were debrided on the right. There was some purulence here. Sphenoidotomy wias with some mucopurulence. The maxillary sinus was clear but the posterior ethmoid sinuses are with moderate edema and frontal recess with moderate edema on the right only. The left maxillary sinus is clear today The frontal sinusotomies anatomically narrow moderate edema on right, widely patent on left. No polyps were noted. There is no CSF leak. Otherwise the inferior, middle, superior turbinates and meatuses, sphenoethmoid recess, nasopharynx, nasal cavity and septum were all normal.The patient tolerated the procedure well and there were no complications.       Scribe Attestation  By signing my name below, I, Lopez Valencia, attest that this documentation has been prepared under the direction and in the presence of Donnie Eden MD. All medical record entries made by the Scribe were at my direction or personally dictated by me. I have reviewed the chart and agree that the record accurately reflects my personal performance of the history, physical exam, discussion and plan.

## 2024-07-15 NOTE — PATIENT INSTRUCTIONS
PATIENT INSTRUCTIONS ARE COMPLETE and READY TO PRINT    Doxycycline:  You were prescribed a 10-day course of doxycycline. Take it twice a day after breakfast and supper.  In the middle of the day, take a daily probiotic or yogurt. Doxycycline can cause a skin sensitivity reaction with the sun. Avoid sun exposure while taking this medication or apply strong sunscreen (SPF-50 or higher) when in the sun. Please do not consume any dairy, calcium, or iron for 2 hours before or after taking doxycycline because this inactivates doxycycline. Common side effects from doxycycline include stomach upset and diarrhea. Take the antibiotic after meals to avoid these side effects. If you develop abdominal pain, skin rash, or diarrhea, please contact our office at [phone].    Medrol Dosepak:  I prescribed a Medrol Dosepak (methylprednisolone). Take as directed. Please take the medication each day before noon. If you take it later in the day, it can cause difficulty sleeping.  The potential side effects of oral steroid use include but are not limited to: difficulty sleeping/insomnia, increased appetite, fluid retention, mood swings, weight gain, change in blood pressure, high blood glucose, possible adrenal suppression, osteoporosis, avascular necrosis of the hip, glaucoma, and cataracts.  Please discontinue prednisone if your blood sugar levels go consistently above 200 mg/dL.    Budesonide irrigations/Xhance:  Please increase your budesonide irrigations to twice daily until finished.  After finishing budesonide, please start using Xhance nasal spray. Do 1 puff in each nostril twice a day. If Xhance is approved by your insurance, [facility] will contact you. To watch an Xhance demonstration video, please visit [email or web address]  If Xhance is not approved by your insurance, please use 2 sprays of Flonase in each nostril twice daily.  I recommend tilting your head after doing a budesonide irrigation to get the solution  to coat your left cheek sinus.  To prepare a budesonide irrigation: Add 1 salt/baking soda packet to 8 ounces of distilled water, or use the recipe below to make your own saline solution. Shake to dissolve. Then, add 1 budesonide capsule to the saline solution. Use half of the mixture (4 ounces) in each nostril.  Make sure that the budesonide irrigation is the last irrigation you do for at least several hours and the last irrigation before going to sleep. This allows the budesonide to coat your nasal cavity.    Recipe to Make your Own Nasal Saline Solution:  Mix 8 ounces of distilled water or tap water (be sure to boil tap water, then let it cool down) with 1/2 teaspoon of baking soda and 1/2 teaspoon of table salt and shake bottle to dissolve.    Nasal moisturization:  Please continue using antibiotic ointment, nasal saline gel (such as Ayr saline gel), or Vaseline in your nose. Use it after nasal irrigations.  If you get it in a tube, apply a pea-sized amount to the pad of your finger. Then, swipe it into the front of your nostril and sniff back gently. Repeat this in the opposite nostril.  If you get saline gel in a spray bottle, lift the outside of your nostril and spray directly at the center of your nose. After spraying, gently sniff back and pinch your nose.  Please do not use a Q-tip, fingernail, or rub your finger in your nose because this causes irritation.  Moisturizing your nose will prevent nasal dryness and is especially beneficial during the winter season.    Sinus infection:  If you develop copious thick yellow green drainage or facial pressure, please start doing nasal saline irrigations twice daily for about 5 days. This can sometimes prevent a full-blown sinus infection from developing. If you continue to have thick yellow green drainage or facial pressure for more than 4-5 days, please contact our office by calling [phone] for an antibiotic.    Follow up:  Please follow up with me in 3  months for reevaluation or sooner with any questions or concerns. Please feel free to contact my office at [phone] with any questions.    Tiffaniee Attestation  By signing my name below, I, Lopez Valencia, attest that this documentation has been prepared under the direction and in the presence of Donnie Eden MD. All medical record entries made by the Tiffaniee were at my direction or personally dictated by me. I have reviewed the chart and agree that the record accurately reflects my personal performance of the history, physical exam, discussion and plan.

## 2024-07-17 ENCOUNTER — PHARMACY VISIT (OUTPATIENT)
Dept: PHARMACY | Facility: CLINIC | Age: 54
End: 2024-07-17
Payer: COMMERCIAL

## 2024-07-18 ENCOUNTER — APPOINTMENT (OUTPATIENT)
Dept: OTOLARYNGOLOGY | Facility: CLINIC | Age: 54
End: 2024-07-18
Payer: COMMERCIAL

## 2024-07-18 VITALS — TEMPERATURE: 97 F | WEIGHT: 266.5 LBS | BODY MASS INDEX: 38.15 KG/M2 | HEIGHT: 70 IN

## 2024-07-18 DIAGNOSIS — J34.89 NASAL CRUSTING: ICD-10-CM

## 2024-07-18 DIAGNOSIS — J32.4 CHRONIC PANSINUSITIS: Primary | ICD-10-CM

## 2024-07-18 DIAGNOSIS — J33.9 NASAL POLYPS: ICD-10-CM

## 2024-07-18 DIAGNOSIS — J33.8 POLYP OF NASAL SINUS: ICD-10-CM

## 2024-07-18 PROCEDURE — 99214 OFFICE O/P EST MOD 30 MIN: CPT | Performed by: OTOLARYNGOLOGY

## 2024-07-18 PROCEDURE — 31237 NSL/SINS NDSC SURG BX POLYPC: CPT | Performed by: OTOLARYNGOLOGY

## 2024-07-18 PROCEDURE — RXMED WILLOW AMBULATORY MEDICATION CHARGE

## 2024-07-18 PROCEDURE — 3008F BODY MASS INDEX DOCD: CPT | Performed by: OTOLARYNGOLOGY

## 2024-07-18 RX ORDER — DOXYCYCLINE 100 MG/1
100 TABLET ORAL 2 TIMES DAILY
Qty: 20 TABLET | Refills: 0 | Status: SHIPPED | OUTPATIENT
Start: 2024-07-18 | End: 2024-07-29

## 2024-07-18 RX ORDER — FLUTICASONE PROPIONATE 93 UG/1
SPRAY, METERED NASAL
Qty: 16 ML | Refills: 11 | Status: SHIPPED | OUTPATIENT
Start: 2024-07-18

## 2024-07-18 RX ORDER — METHYLPREDNISOLONE 4 MG/1
TABLET ORAL
Qty: 21 TABLET | Refills: 0 | Status: SHIPPED | OUTPATIENT
Start: 2024-07-18 | End: 2024-07-25

## 2024-07-18 ASSESSMENT — PATIENT HEALTH QUESTIONNAIRE - PHQ9
1. LITTLE INTEREST OR PLEASURE IN DOING THINGS: NOT AT ALL
SUM OF ALL RESPONSES TO PHQ9 QUESTIONS 1 AND 2: 0
2. FEELING DOWN, DEPRESSED OR HOPELESS: NOT AT ALL

## 2024-07-19 ENCOUNTER — PHARMACY VISIT (OUTPATIENT)
Dept: PHARMACY | Facility: CLINIC | Age: 54
End: 2024-07-19
Payer: COMMERCIAL

## 2024-07-26 ENCOUNTER — LAB (OUTPATIENT)
Dept: LAB | Facility: LAB | Age: 54
End: 2024-07-26
Payer: COMMERCIAL

## 2024-07-26 DIAGNOSIS — E66.09 CLASS 2 OBESITY DUE TO EXCESS CALORIES WITHOUT SERIOUS COMORBIDITY WITH BODY MASS INDEX (BMI) OF 39.0 TO 39.9 IN ADULT: ICD-10-CM

## 2024-07-26 DIAGNOSIS — I10 PRIMARY HYPERTENSION: ICD-10-CM

## 2024-07-26 LAB
ALBUMIN SERPL BCP-MCNC: 4.3 G/DL (ref 3.4–5)
ALP SERPL-CCNC: 80 U/L (ref 33–120)
ALT SERPL W P-5'-P-CCNC: 43 U/L (ref 10–52)
ANION GAP SERPL CALC-SCNC: 16 MMOL/L (ref 10–20)
AST SERPL W P-5'-P-CCNC: 26 U/L (ref 9–39)
BASOPHILS # BLD AUTO: 0.06 X10*3/UL (ref 0–0.1)
BASOPHILS NFR BLD AUTO: 0.8 %
BILIRUB SERPL-MCNC: 0.6 MG/DL (ref 0–1.2)
BUN SERPL-MCNC: 15 MG/DL (ref 6–23)
CALCIUM SERPL-MCNC: 9.3 MG/DL (ref 8.6–10.3)
CHLORIDE SERPL-SCNC: 100 MMOL/L (ref 98–107)
CO2 SERPL-SCNC: 26 MMOL/L (ref 21–32)
CREAT SERPL-MCNC: 0.9 MG/DL (ref 0.5–1.3)
EGFRCR SERPLBLD CKD-EPI 2021: >90 ML/MIN/1.73M*2
EOSINOPHIL # BLD AUTO: 0.17 X10*3/UL (ref 0–0.7)
EOSINOPHIL NFR BLD AUTO: 2.1 %
ERYTHROCYTE [DISTWIDTH] IN BLOOD BY AUTOMATED COUNT: 13.4 % (ref 11.5–14.5)
GLUCOSE SERPL-MCNC: 69 MG/DL (ref 74–99)
HCT VFR BLD AUTO: 49.7 % (ref 41–52)
HGB BLD-MCNC: 16.3 G/DL (ref 13.5–17.5)
IMM GRANULOCYTES # BLD AUTO: 0.03 X10*3/UL (ref 0–0.7)
IMM GRANULOCYTES NFR BLD AUTO: 0.4 % (ref 0–0.9)
LYMPHOCYTES # BLD AUTO: 1.94 X10*3/UL (ref 1.2–4.8)
LYMPHOCYTES NFR BLD AUTO: 24.5 %
MCH RBC QN AUTO: 30.4 PG (ref 26–34)
MCHC RBC AUTO-ENTMCNC: 32.8 G/DL (ref 32–36)
MCV RBC AUTO: 93 FL (ref 80–100)
MONOCYTES # BLD AUTO: 0.64 X10*3/UL (ref 0.1–1)
MONOCYTES NFR BLD AUTO: 8.1 %
NEUTROPHILS # BLD AUTO: 5.07 X10*3/UL (ref 1.2–7.7)
NEUTROPHILS NFR BLD AUTO: 64.1 %
NRBC BLD-RTO: 0 /100 WBCS (ref 0–0)
PLATELET # BLD AUTO: 320 X10*3/UL (ref 150–450)
POTASSIUM SERPL-SCNC: 3.9 MMOL/L (ref 3.5–5.3)
PROT SERPL-MCNC: 7.5 G/DL (ref 6.4–8.2)
RBC # BLD AUTO: 5.37 X10*6/UL (ref 4.5–5.9)
SODIUM SERPL-SCNC: 138 MMOL/L (ref 136–145)
TSH SERPL-ACNC: 1.23 MIU/L (ref 0.44–3.98)
WBC # BLD AUTO: 7.9 X10*3/UL (ref 4.4–11.3)

## 2024-07-26 PROCEDURE — 84443 ASSAY THYROID STIM HORMONE: CPT

## 2024-07-26 PROCEDURE — 82306 VITAMIN D 25 HYDROXY: CPT

## 2024-07-26 PROCEDURE — 83036 HEMOGLOBIN GLYCOSYLATED A1C: CPT

## 2024-07-26 PROCEDURE — 36415 COLL VENOUS BLD VENIPUNCTURE: CPT

## 2024-07-26 PROCEDURE — 85025 COMPLETE CBC W/AUTO DIFF WBC: CPT

## 2024-07-26 PROCEDURE — 80053 COMPREHEN METABOLIC PANEL: CPT

## 2024-07-27 LAB
25(OH)D3 SERPL-MCNC: 25 NG/ML (ref 30–100)
COTININE UR QL SCN: NEGATIVE
EST. AVERAGE GLUCOSE BLD GHB EST-MCNC: 117 MG/DL
HBA1C MFR BLD: 5.7 %

## 2024-08-01 ENCOUNTER — TELEPHONE (OUTPATIENT)
Dept: OTOLARYNGOLOGY | Facility: CLINIC | Age: 54
End: 2024-08-01
Payer: COMMERCIAL

## 2024-08-01 NOTE — TELEPHONE ENCOUNTER
Patient contacted office to notify that his Xhance prescription was going to be a 30 day waiting period to see if approved by his insurance. Patient was offered free trial from [facility] during this waiting period but decided to forgo the medication in case it was not approved. Patient will use Flonase 2 sprays BID as instructed by Dr. Eden instead. Patient will begin this once he has completed his Budesonide irrigation prescription.

## 2024-09-24 NOTE — PROGRESS NOTES
Sinus & Skull Base Surgery    [facility]   3/7/24 - Patient presents for his 1st post op visit. He reports no acute concerns. He reports expected post operative congestion and edema. He only needed to take the pain medication for 2 days after surgery. The patient has been doing saline irrigations 5-7 times daily, which have been productive of crusts and dried/old blood. He denies excessive bleeding, constant clear fluid from nose, severe headaches, double vision, or fevers.  3/21/24 - Patient presents for his 2nd post op visit. He reports doing very well. He is happy to be able to breathe through his nose. He was able to smell coffee in the first time in over 1 year. He reports increased congestion since the last visit. He describes the congestion as having to blow his nose frequently. He gets occasional blood tinged drainage from the nose. His rinses have been clear. He is irrigating twice daily with budesonide. He will do a third of just saline. He denies excessive bleeding, constant clear fluid from nose, severe headaches, double vision, or fevers.  5/16/24 - Patient presents for followup. He reports doing well. He is breathing well through the nose and is starting to smell things again. He does get occasional hardened mucous that sometimes wants to go down his throat. He is performing budesonide irrigations twice daily.  7/18/24 - Patient presents for a followup visit. He reports doing well with the sinuses/nose. Approximately every 4 days, he reports a large crust on the left side. He is performing budesonide irrigations once daily.  9/27/24 - Patient presents for a followup visit. He reports temporary improvement with doxy x 10 days and the Medrol Dosepak. He completed the budesonide irrigations and switched to using Flonase. At that time, he stopped the saline irrigations and his symptoms worsened. So, he restarted saline irrigations once daily.    Operation/Procedures:  1. Bilateral endoscopic total  ethmoidectomies with sphenoidotomies with removal of tissue from sinuses  2. Bilateral endoscopic frontal sinusotomies with frontal sinus explorations - modifier 22  3. Bilateral endoscopic maxillary antrostomies with removal of tissue from sinuses  4. Septoplasty  5. Bilateral inferior turbinate submucous resection  6. Image guidance navigation - extradural     Surgeon: Donnie Eden MD FACS  Assistant: Art Greco DO  EBL: 200 ml  Anesthesia: General and local    Operative Findings:  1. Chronic inflammation through all sinuses, with nasal sinus polyps  2. Absorbable hemostatic material in the ethmoids  3. Barba Splint sutured to the septum bilaterally  4. Propel Contour stents placed in the frontal sinusotomies bilaterally  5. Naturally narrow frontal sinus outflow tracts with extensive polyposis and bleeding resulting in a complex procedure modifier 22.    Per initial note 1/4/2024:  Son Rouse is a 53 y.o. male, referred by my highly esteemed colleague Dr. Alexandro Nicolas MD. The patient presents with concerns of sinus and nose problems that began at least 1 year ago but worsened approximately 3 months ago. The patient describes his sinus/nose symptoms as #1 nasal obstruction, #2 posterior nasal drainage, #3 anosmia for about 3 months. He was able to walk up to 3 miles/day a couple years ago. This has gradually reduced to 2 miles/day and now to only 1 mile/day as of last year. You endorses yellow nasal drainage in the morning. Patient denies facial pressure, rhinorrhea, facial pain, periorbital edema, epiphora, loss of taste, loss of smell, and epistaxis. The patient has taken fluticasone for greater than 3 months and azelastine to alleviate the problem. He has a history of allergic rhinitis or allergies. In 2018, he tested positive to ragweed, mold spores, oak. He denies any diagnosis of nasal polyps or chronic sinusitis by the allergist in 2018. He is allergic to NSAIDs and has a history of AERD. He takes  Symbicort for asthma.    History of prior nasal/sinus surgery or procedure: Denies  History of Prediabetes  Allergies: Nsaids (non-steroidal anti-inflammatory drug)    Review of Systems   Negative for constitutional, eyes, cardiac, pulmonary, hepatic, renal, digestive, hematologic, epileptic, syncopal, musculoskeletal, mental health, integumentary, hypertensive, lipid, arthritic, diabetic, thyroid or neurologic disorders (except as listed in the HPI, PMH and Problem List).    Assessment   Son Rouse is a 54 y.o. male h/o chronic pansinusitis w NP, right DNS, ITH, nasal obstruction s/p bilateral ESS (total), septoplasty, bilateral ITR. The patient had surgery 3/1/2024.  3/7/24 - 1st post op. Expected post op swelling today. Debridement performed as noted. Rx budesonide irrigations BID x 3 months. Continue saline irrigations.  3/21/24 - 2nd post op. Doing well. Breathing well. Some smell has come back. Frontals patent today, naturally narrow. Debridement performed as noted. Continue budesonide irrigations BID. Rec nasal moisturization.  5/16/24 - Sinuses PRISTINE, except for edema of the left maxillary sinus. Debridement performed. Continue budesonide irrigations BID.  7/18/24 - Left sinuses PRISTINE. Right side with low grade infection crusting and edema in ethmoids. Rx doxy x 10 days. Rx Medrol Dosepak. Longer term would like to start Rx Xhance. Increase budseonide irrig to BID until finished.  9/27/24 - Increased sx pressure and drainage. Significant moderate edema in ethmoids and frontals. Rx Medrol dose zaid. Start Xhance. Continue Flonase until receiving Xhance. Continue daily saline irrig.    Plan   Nasal endoscopy. Findings: as noted.  Continue saline irrigations once daily.  Patient was instructed to start Xhance. He agreed to contact the pharmacy/dispenser and call our office with any questions.  Until receiving Xhance, I recommend continuing Flonase, 1 spray in each nostril twice daily.  If the  patient develops thick yellow green drainage, facial pressure, or loss of smell/taste, he was instructed to start doing nasal saline irrigations twice daily. If he continues to have thick yellow green drainage, facial pressure, or loss of smell/taste for more than 5-6 days, he was instructed to call our office for an antibiotic.  The potential side effects of oral steroid use were discussed at length with the patient. These risks include but are not limited to: difficulty sleeping/insomnia, increased appetite, fluid retention, mood swings, weight gain, change in blood pressure, high blood glucose, possible adrenal suppression, osteoporosis, avascular necrosis of the hip, glaucoma, and cataracts. The patient understands these risks and is willing to proceed with oral steroid therapy.   Patient may continue nasal moisturization with antibiotic ointment, saline gel, or Vaseline twice daily. Patient was instructed to swipe a pea-sized amount into anterior nares and sniff back. Patient was instructed to avoid applying it with Q-tips and use it after any nasal sprays or irrigations.  Follow up with me in 3 months.    Donnie Eden MD [facility]  Division of Rhinology, Sinus, and Skull Base Surgery       Exam   General: This is a healthy appearing male who appears his stated age. The patient is alert and appropriately verbally conversant without hoarseness.  Face: The face was inspected and no cutaneous masses or lesions were visualized. There was no erythema or edema noted. Facial movement was symmetric without weakness. No skin lesions were detected.  Eyes: Extra-ocular muscle function was intact. No nystagmus was observed. Pupils were equal.    Nose: Examination of the nose revealed the nasal dorsum to be midline. Intranasal exam reveals the septum is healthy without perforation. The inferior turbinates were expectedly edematous. Crusts and dried secretions noted on anterior rhinoscopy. See below procedure note as applicable  for further exam.    Procedure Note:  Procedure: Nasal endoscopy - diagnostic BILATERAL  Indication: Chronic rhinosinusitis, post operative from sinus surgery  Informed consent obtained: risks, benefits, alternatives, and expectations discussed with patient and the patient wishes to proceed.    Findings: After anesthesia and decongestion with topical lidocaine and Afrin spray, the nasal cavities were examined with a zero and/or 30-degree endoscope. Nasal cavity is clear bilaterally. Sphenoidotomies are clear bilaterally. Anterior and posterior ethmoid sinuses are with moderate edema and frontal recess with moderate to severe edema bilaterally. Maxillary sinuses patent with mild edema. The frontal sinusotomies anatomically narrow moderate edema bilaterally. No polyps were noted. There is no CSF leak. Otherwise the inferior, middle, superior turbinates and meatuses, sphenoethmoid recess, nasopharynx, nasal cavity and septum were all normal.The patient tolerated the procedure well and there were no complications.       Scribe Attestation  By signing my name below, IKevin Scribe, attest that this documentation has been prepared under the direction and in the presence of Donnie Eden MD.

## 2024-09-24 NOTE — PATIENT INSTRUCTIONS
PATIENT INSTRUCTIONS ARE COMPLETE and READY TO PRINT    Saline Irrigations:  Please continue doing saline irrigations once daily.  To prepare a saline irrigation, add 1 salt/baking soda packet to 8 ounces of distilled water, or use the recipe below to make your own saline solution. Shake to dissolve. Use half of the solution (4 ounces) in each nostril.  When you decide to do an irrigation, please irrigate first before using the nasal sprays. Otherwise, you will wash the nasal spray out of your nose with the irrigation.    Recipe to Make your Own Nasal Saline Solution:  Mix 8 ounces of distilled water or tap water (be sure to boil tap water, then let it cool down) with 1/2 teaspoon of baking soda and 1/2 teaspoon of table salt and shake bottle to dissolve.    Flonase:  Please continue using Flonase until you receive Xhance. Do 1 spray of Flonase in each nostril twice daily.  Be sure to aim the Flonase spray slightly outwards toward the corner of the eye on the same side nostril.  If you are doing an irrigation as well as using Flonase, please irrigate first before using Flonase. Otherwise, you will wash Flonase out of your nose with the irrigation.    Nasal moisturization:  If desired, you may use antibiotic ointment, nasal saline gel (such as Ayr saline gel), or Vaseline in your nose. Use it after nasal irrigations.  If you get it in a tube, apply a pea-sized amount to the pad of your finger. Then, swipe it into the front of your nostril and sniff back gently. Repeat this in the opposite nostril.  If you get saline gel in a spray bottle, lift the outside of your nostril and spray directly at the center of your nose. After spraying, gently sniff back and pinch your nose.  Please do not use a Q-tip, fingernail, or rub your finger in your nose because this causes irritation.  Moisturizing your nose will prevent nasal dryness and is especially beneficial during the winter season.    Sinus infection:  If you develop  copious thick yellow green drainage, facial pressure, or loss of smell/taste, please start doing nasal saline irrigations twice daily for about 5 days. This can sometimes prevent a full-blown sinus infection from developing. If you continue to have thick yellow green drainage, facial pressure, or loss of smell/taste for more than 5-6 days, please contact our office by calling [phone] for an antibiotic.    Follow up:  Please follow up with me in 3 months for reevaluation. Feel free to contact my office at [phone] with any questions.    Scribe Attestation  By signing my name below, I, Lopez Valencia, attest that this documentation has been prepared under the direction and in the presence of Donnie Eden MD.

## 2024-09-27 ENCOUNTER — APPOINTMENT (OUTPATIENT)
Dept: OTOLARYNGOLOGY | Facility: CLINIC | Age: 54
End: 2024-09-27
Payer: COMMERCIAL

## 2024-09-27 VITALS — TEMPERATURE: 97.2 F | HEIGHT: 70 IN | BODY MASS INDEX: 38.22 KG/M2 | WEIGHT: 267 LBS

## 2024-09-27 DIAGNOSIS — J33.8 POLYP OF NASAL SINUS: ICD-10-CM

## 2024-09-27 DIAGNOSIS — J32.4 CHRONIC PANSINUSITIS: ICD-10-CM

## 2024-09-27 DIAGNOSIS — J32.4 CHRONIC PANSINUSITIS: Primary | ICD-10-CM

## 2024-09-27 PROCEDURE — 99214 OFFICE O/P EST MOD 30 MIN: CPT | Performed by: OTOLARYNGOLOGY

## 2024-09-27 PROCEDURE — 3008F BODY MASS INDEX DOCD: CPT | Performed by: OTOLARYNGOLOGY

## 2024-09-27 PROCEDURE — 31231 NASAL ENDOSCOPY DX: CPT | Performed by: OTOLARYNGOLOGY

## 2024-09-27 RX ORDER — METHYLPREDNISOLONE 4 MG/1
TABLET ORAL
Qty: 21 TABLET | Refills: 0 | Status: SHIPPED | OUTPATIENT
Start: 2024-09-27 | End: 2024-10-04

## 2024-10-03 ENCOUNTER — APPOINTMENT (OUTPATIENT)
Dept: OTOLARYNGOLOGY | Facility: CLINIC | Age: 54
End: 2024-10-03
Payer: COMMERCIAL

## 2024-11-14 ENCOUNTER — APPOINTMENT (OUTPATIENT)
Dept: PRIMARY CARE | Facility: CLINIC | Age: 54
End: 2024-11-14
Payer: COMMERCIAL

## 2024-11-14 VITALS
HEART RATE: 108 BPM | BODY MASS INDEX: 38.54 KG/M2 | OXYGEN SATURATION: 97 % | SYSTOLIC BLOOD PRESSURE: 142 MMHG | WEIGHT: 269.2 LBS | DIASTOLIC BLOOD PRESSURE: 84 MMHG | HEIGHT: 70 IN

## 2024-11-14 DIAGNOSIS — I10 PRIMARY HYPERTENSION: Primary | ICD-10-CM

## 2024-11-14 PROCEDURE — 3079F DIAST BP 80-89 MM HG: CPT | Performed by: STUDENT IN AN ORGANIZED HEALTH CARE EDUCATION/TRAINING PROGRAM

## 2024-11-14 PROCEDURE — 3008F BODY MASS INDEX DOCD: CPT | Performed by: STUDENT IN AN ORGANIZED HEALTH CARE EDUCATION/TRAINING PROGRAM

## 2024-11-14 PROCEDURE — 3077F SYST BP >= 140 MM HG: CPT | Performed by: STUDENT IN AN ORGANIZED HEALTH CARE EDUCATION/TRAINING PROGRAM

## 2024-11-14 PROCEDURE — 99214 OFFICE O/P EST MOD 30 MIN: CPT | Performed by: STUDENT IN AN ORGANIZED HEALTH CARE EDUCATION/TRAINING PROGRAM

## 2024-11-14 RX ORDER — LOSARTAN POTASSIUM 25 MG/1
25 TABLET ORAL DAILY
Qty: 100 TABLET | Refills: 3 | Status: SHIPPED | OUTPATIENT
Start: 2024-11-14 | End: 2025-12-19

## 2024-11-14 NOTE — PROGRESS NOTES
Subjective   Patient ID: Son Rouse is a 54 y.o. male who presents for Hypertension (PT IS HERE FOR BLOOD PRESSURE CHECK. Bp 140/60.).  HPI  Pt has been very inconsistent with working out. He has had increased stress at work.      Objective   Physical Exam  Vitals reviewed.   Constitutional:       Appearance: Normal appearance.   Cardiovascular:      Rate and Rhythm: Normal rate and regular rhythm.      Heart sounds: No murmur heard.  Pulmonary:      Effort: Pulmonary effort is normal. No respiratory distress.      Breath sounds: Normal breath sounds. No wheezing.   Musculoskeletal:      Cervical back: Neck supple.      Left lower leg: No edema.   Neurological:      Mental Status: He is alert.       Assessment/Plan   Diagnoses and all orders for this visit:  Primary hypertension  -     losartan (Cozaar) 25 mg tablet; Take 1 tablet (25 mg) by mouth once daily.    Son Rouse is a 53 y.o. male presents for BP check      BP  142/84 mmHg   Start losartan 25 mg daily  Has been attempting lifestyle changes for BP.   Has been noted previously   Very stressful work environment   CT calcium score 2019: 27 low risk   Weight gain noted   Fu in 4 months for fu      Reactive Airway Disease   Refill Symbicort 1 puff daily   Night time none  Albuterol PRN twice a month   Intermittently using flonase      Colonoscopy 2019: 10 year fu        Verenice Cruz DO 11/14/24 3:35 PM

## 2024-12-30 ENCOUNTER — APPOINTMENT (OUTPATIENT)
Dept: PRIMARY CARE | Facility: CLINIC | Age: 54
End: 2024-12-30
Payer: COMMERCIAL

## 2024-12-30 DIAGNOSIS — R03.0 ELEVATED BP WITHOUT DIAGNOSIS OF HYPERTENSION: Primary | ICD-10-CM

## 2024-12-30 PROCEDURE — 99213 OFFICE O/P EST LOW 20 MIN: CPT | Performed by: STUDENT IN AN ORGANIZED HEALTH CARE EDUCATION/TRAINING PROGRAM

## 2024-12-30 NOTE — PROGRESS NOTES
Subjective   Patient ID: Son Rouse is a 54 y.o. male who presents for No chief complaint on file..  HPI  Pt states that he has been having a tough time this Holiday season due to grief with remembrance of his daughter and mother.   Wife took BP this morning which was elevated.       Current Outpatient Medications:     albuterol (ProAir HFA) 90 mcg/actuation inhaler, Inhale 2 puffs every 4 hours if needed for shortness of breath or wheezing., Disp: 18 g, Rfl: 3    budesonide, bulk, powder, 1 capsule 2 times a day. Compound 0.6 mg capsule to be added to sinus rinse twice daily., Disp: 180 g, Rfl: 0    budesonide-formoteroL (Symbicort) 80-4.5 mcg/actuation inhaler, Inhale 2 puffs 2 times a day., Disp: 30.6 g, Rfl: 1    fluticasone (Flonase) 50 mcg/actuation nasal spray, Administer 2 sprays into each nostril once daily., Disp: , Rfl:     losartan (Cozaar) 25 mg tablet, Take 1 tablet (25 mg) by mouth once daily., Disp: 100 tablet, Rfl: 3        Assessment/Plan   There are no diagnoses linked to this encounter.    Son Rouse is a 53 y.o. male presents for BP check.      BP  150/100 mmHg   Pt to drop by office to obtain BP within the next month. Will adjust at that time if needed.   Continue losartan 25 mg daily  Has been attempting lifestyle changes for BP.   Has been noted previously   Very stressful work environment   CT calcium score 2019: 27 low risk   Weight gain noted   Fu in 4 months for fu      Reactive Airway Disease   Continued Symbicort 1 puff daily   Night time none  Albuterol PRN twice a month   Intermittently using flonase      Colonoscopy 2019: 10 year fu        Verenice Cruz DO 12/30/24 8:45 AM

## 2025-01-02 ENCOUNTER — APPOINTMENT (OUTPATIENT)
Dept: OTOLARYNGOLOGY | Facility: CLINIC | Age: 55
End: 2025-01-02
Payer: COMMERCIAL

## 2025-01-16 ENCOUNTER — APPOINTMENT (OUTPATIENT)
Dept: OTOLARYNGOLOGY | Facility: CLINIC | Age: 55
End: 2025-01-16
Payer: COMMERCIAL

## 2025-01-23 NOTE — PATIENT INSTRUCTIONS
PATIENT INSTRUCTIONS ARE COMPLETE and READY TO PRINT    Saline Irrigations:  Please continue doing saline irrigations once daily.  To prepare a saline irrigation, add 1 salt/baking soda packet to 8 ounces of distilled water, or use the recipe below to make your own saline solution. Shake to dissolve. Use half of the solution (4 ounces) in each nostril.  When you decide to do an irrigation, please irrigate first before using the nasal sprays. Otherwise, you will wash the nasal spray out of your nose with the irrigation.     Recipe to Make your Own Nasal Saline Solution:  Mix 8 ounces of distilled water or tap water (be sure to boil tap water, then let it cool down) with 1/2 teaspoon of baking soda and 1/2 teaspoon of table salt and shake bottle to dissolve.    Xhance:  I gave you a sample of Xhance nasal spray. Do 1 puff of Xhance in each nostril twice a day. To watch an Xhance demonstration video, please visit [email or web address]  After finishing the Xhance sample, start flunisolide. Do 2 sprays of flunisolide in each nostril twice daily.  If you decide to do an irrigation, please irrigate first before using Xhance or flunisolide. Otherwise, you will wash the nasal spray out of your nose with the irrigation.     Nasal moisturization:  If desired, you may use antibiotic ointment, nasal saline gel (such as Ayr saline gel), or Vaseline in your nose. Use it at the end after nasal irrigations and nasal sprays (Flonase or Xhance).  If you get it in a tube, apply a pea-sized amount to the pad of your finger. Then, swipe it into the front of your nostril and sniff back gently. Repeat this in the opposite nostril.  If you get saline gel in a spray bottle, lift the outside of your nostril and spray directly at the center of your nose. After spraying, gently sniff back and pinch your nose.  Please do not use a Q-tip, fingernail, or rub your finger in your nose because this causes irritation.  Moisturizing your nose will prevent  nasal dryness and is especially beneficial during the winter season.     Sinus infection:  If you develop copious thick yellow green drainage, facial pressure, or loss of smell/taste, please start doing saline irrigations twice daily for about 5 days. This can sometimes prevent a full-blown sinus infection from developing. If you continue to have thick yellow green drainage, facial pressure, or loss of smell/taste for more than 5-6 days, please contact our office by calling [phone] for an antibiotic.     Follow up:  Please follow up with me in 2 months for reevaluation. Feel free to contact my office at [phone] with any questions.    Scribe Attestation  By signing my name below, I, Lopez Valencia, attest that this documentation has been prepared under the direction and in the presence of Donnie Eden MD.

## 2025-01-23 NOTE — PROGRESS NOTES
Sinus & Skull Base Surgery    [facility]   3/7/24 - Patient presents for his 1st post op visit. He reports no acute concerns. He reports expected post operative congestion and edema. He only needed to take the pain medication for 2 days after surgery. The patient has been doing saline irrigations 5-7 times daily, which have been productive of crusts and dried/old blood. He denies excessive bleeding, constant clear fluid from nose, severe headaches, double vision, or fevers.  3/21/24 - Patient presents for his 2nd post op visit. He reports doing very well. He is happy to be able to breathe through his nose. He was able to smell coffee in the first time in over 1 year. He reports increased congestion since the last visit. He describes the congestion as having to blow his nose frequently. He gets occasional blood tinged drainage from the nose. His rinses have been clear. He is irrigating twice daily with budesonide. He will do a third of just saline. He denies excessive bleeding, constant clear fluid from nose, severe headaches, double vision, or fevers.  5/16/24 - Patient presents for followup. He reports doing well. He is breathing well through the nose and is starting to smell things again. He does get occasional hardened mucous that sometimes wants to go down his throat. He is performing budesonide irrigations twice daily.  7/18/24 - Patient presents for a followup visit. He reports doing well with the sinuses/nose. Approximately every 4 days, he reports a large crust on the left side. He is performing budesonide irrigations once daily.  9/27/24 - Patient presents for a followup visit. He reports temporary improvement with doxy x 10 days and the Medrol Dosepak. He completed the budesonide irrigations and switched to using Flonase. At that time, he stopped the saline irrigations and his symptoms worsened. So, he restarted saline irrigations once daily.    1/24/25 - Patient presents for a followup visit. He reports mucous  occasionally that clears with a rinse. He notes great sense of smell. His PND resolved. He did not obtain Xhance. He also did not start the oral steroid. He is using Flonase at least once daily and often twice daily.    Operation/Procedures:  1. Bilateral endoscopic total ethmoidectomies with sphenoidotomies with removal of tissue from sinuses  2. Bilateral endoscopic frontal sinusotomies with frontal sinus explorations - modifier 22  3. Bilateral endoscopic maxillary antrostomies with removal of tissue from sinuses  4. Septoplasty  5. Bilateral inferior turbinate submucous resection  6. Image guidance navigation - extradural     Surgeon: Donnie Eden MD FACS  Assistant: Art Greco DO  EBL: 200 ml  Anesthesia: General and local    Operative Findings:  1. Chronic inflammation through all sinuses, with nasal sinus polyps  2. Absorbable hemostatic material in the ethmoids  3. Barba Splint sutured to the septum bilaterally  4. Propel Contour stents placed in the frontal sinusotomies bilaterally  5. Naturally narrow frontal sinus outflow tracts with extensive polyposis and bleeding resulting in a complex procedure modifier 22.    Per initial note 1/4/2024:  Son Rouse is a 53 y.o. male, referred by my highly esteemed colleague Dr. Alexandro Nicolas MD. The patient presents with concerns of sinus and nose problems that began at least 1 year ago but worsened approximately 3 months ago. The patient describes his sinus/nose symptoms as #1 nasal obstruction, #2 posterior nasal drainage, #3 anosmia for about 3 months. He was able to walk up to 3 miles/day a couple years ago. This has gradually reduced to 2 miles/day and now to only 1 mile/day as of last year. You endorses yellow nasal drainage in the morning. Patient denies facial pressure, rhinorrhea, facial pain, periorbital edema, epiphora, loss of taste, loss of smell, and epistaxis. The patient has taken fluticasone for greater than 3 months and azelastine to alleviate  the problem. He has a history of allergic rhinitis or allergies. In 2018, he tested positive to ragweed, mold spores, oak. He denies any diagnosis of nasal polyps or chronic sinusitis by the allergist in 2018. He is allergic to NSAIDs and has a history of AERD. He takes Symbicort for asthma.    History of prior nasal/sinus surgery or procedure: Denies  History of Prediabetes  Allergies: Nsaids (non-steroidal anti-inflammatory drug)    Assessment   Son Rouse is a 54 y.o. male h/o chronic pansinusitis w NP, right DNS, ITH, nasal obstruction s/p bilateral ESS (total), septoplasty, bilateral ITR. The patient had surgery 3/1/2024.  3/7/24 - 1st post op. Expected post op swelling today. Debridement performed as noted. Rx budesonide irrigations BID x 3 months. Continue saline irrigations.  3/21/24 - 2nd post op. Doing well. Breathing well. Some smell has come back. Frontals patent today, naturally narrow. Debridement performed as noted. Continue budesonide irrigations BID. Rec nasal moisturization.  5/16/24 - Sinuses PRISTINE, except for edema of the left maxillary sinus. Debridement performed. Continue budesonide irrigations BID.  7/18/24 - Left sinuses PRISTINE. Right side with low grade infection crusting and edema in ethmoids. Rx doxy x 10 days. Rx Medrol Dosepak. Longer term would like to start Rx Xhance. Increase budseonide irrig to BID until finished.  9/27/24 - Increased sx pressure and drainage. Significant moderate edema in ethmoids and frontals. Rx Medrol dose zaid. Start Xhance. Continue Flonase until receiving Xhance. Continue daily saline irrig.  1/24/25 - No infection but stable polypoid edema in frontal recess. Xhance sample provided, Start flunisolide after finishing sample. Has already failed greater than 3 months of budesonide irrigations and fluticasone.    Plan   Nasal endoscopy. Findings: as noted.  Patient was given a sample of Xhance, 1 puff in each nostril BID.  Patient was prescribed  flunisolide nasal spray, 2 sprays in each nostril BID. Patient was previously instructed on the proper technique of aiming towards the lateral wall of the nose on each side.  Continue saline irrigations once daily.  If the patient develops thick yellow green drainage, facial pressure, or loss of smell/taste, he was instructed to start doing saline irrigations twice daily. If he continues to have thick yellow green drainage, facial pressure, or loss of smell/taste for more than 5-6 days, he was instructed to call our office for an antibiotic.  Patient may continue nasal moisturization with antibiotic ointment, saline gel, or Vaseline. Patient was instructed to swipe a pea-sized amount into anterior nares and sniff back. Patient was instructed to avoid applying it with Q-tips and use it after any nasal sprays or irrigations.  Follow up with me in 2 months.    Donnie Eden MD [facility]  Division of Rhinology, Sinus, and Skull Base Surgery       Exam   General: This is a healthy appearing male who appears his stated age. The patient is alert and appropriately verbally conversant without hoarseness.  Face: The face was inspected and no cutaneous masses or lesions were visualized. There was no erythema or edema noted. Facial movement was symmetric without weakness. No skin lesions were detected.  Eyes: Extra-ocular muscle function was intact. No nystagmus was observed. Pupils were equal.    Nose: Examination of the nose revealed the nasal dorsum to be midline. Intranasal exam reveals the septum is healthy without perforation. The inferior turbinates were expectedly edematous. Crusts and dried secretions noted on anterior rhinoscopy. See below procedure note as applicable for further exam.    Procedure Note:  Procedure: Nasal endoscopy - diagnostic BILATERAL  Indication: Chronic rhinosinusitis, post operative from sinus surgery  Informed consent obtained: risks, benefits, alternatives, and expectations discussed with patient  and the patient wishes to proceed.    Findings: After anesthesia and decongestion with topical lidocaine and Afrin spray, the nasal cavities were examined with a zero and/or 30-degree endoscope. Nasal cavity is clear bilaterally. Sphenoidotomies are clear bilaterally. Anterior and posterior ethmoid sinuses are with moderate edema and frontal recess with recurrent polyps bilaterally. Maxillary sinuses patent with mild edema. The frontal sinusotomies not visible. There is no CSF leak. Otherwise the inferior, middle, superior turbinates and meatuses, sphenoethmoid recess, nasopharynx, nasal cavity and septum were all normal.The patient tolerated the procedure well and there were no complications.       Scribe Attestation  By signing my name below, I, Lopez Valencia, attest that this documentation has been prepared under the direction and in the presence of Donnie Eden MD.

## 2025-01-24 ENCOUNTER — APPOINTMENT (OUTPATIENT)
Dept: OTOLARYNGOLOGY | Facility: CLINIC | Age: 55
End: 2025-01-24
Payer: COMMERCIAL

## 2025-01-24 VITALS — TEMPERATURE: 97.2 F | BODY MASS INDEX: 37.65 KG/M2 | WEIGHT: 263 LBS | HEIGHT: 70 IN

## 2025-01-24 DIAGNOSIS — J33.8 POLYP OF NASAL SINUS: ICD-10-CM

## 2025-01-24 DIAGNOSIS — J32.4 CHRONIC PANSINUSITIS: Primary | ICD-10-CM

## 2025-01-24 PROCEDURE — 31231 NASAL ENDOSCOPY DX: CPT | Performed by: OTOLARYNGOLOGY

## 2025-01-24 PROCEDURE — 99214 OFFICE O/P EST MOD 30 MIN: CPT | Performed by: OTOLARYNGOLOGY

## 2025-01-24 PROCEDURE — 3008F BODY MASS INDEX DOCD: CPT | Performed by: OTOLARYNGOLOGY

## 2025-01-24 PROCEDURE — 1036F TOBACCO NON-USER: CPT | Performed by: OTOLARYNGOLOGY

## 2025-01-24 RX ORDER — FLUNISOLIDE 0.25 MG/ML
2 SOLUTION NASAL 2 TIMES DAILY
Qty: 25 ML | Refills: 3 | Status: SHIPPED | OUTPATIENT
Start: 2025-01-24 | End: 2026-01-24

## 2025-02-17 DIAGNOSIS — J33.8 POLYP OF NASAL SINUS: ICD-10-CM

## 2025-02-18 RX ORDER — FLUNISOLIDE 0.25 MG/ML
2 SOLUTION NASAL 2 TIMES DAILY
Refills: 2 | OUTPATIENT
Start: 2025-02-18 | End: 2026-02-18

## 2025-03-27 NOTE — PROGRESS NOTES
Sinus & Skull Base Surgery    HPI   5/16/24 - Patient presents for followup. He reports doing well. He is breathing well through the nose and is starting to smell things again. He does get occasional hardened mucous that sometimes wants to go down his throat. He is performing budesonide irrigations twice daily.  7/18/24 - Patient presents for a followup visit. He reports doing well with the sinuses/nose. Approximately every 4 days, he reports a large crust on the left side. He is performing budesonide irrigations once daily.  9/27/24 - Patient presents for a followup visit. He reports temporary improvement with doxy x 10 days and the Medrol Dosepak. He completed the budesonide irrigations and switched to using Flonase. At that time, he stopped the saline irrigations and his symptoms worsened. So, he restarted saline irrigations once daily.  1/24/25 - Patient presents for a followup visit. He reports mucous occasionally that clears with a rinse. He notes great sense of smell. His PND resolved. He did not obtain Xhance. He also did not start the oral steroid. He is using Flonase at least once daily and often twice daily.  3/28/25 - Patient presents for a followup visit. He completed the trial of flunisolide for 5 weeks and reports limited benefit. For the last 3 weeks, he has discolored crusting. July 2024 was his last course of antibiotics.     Operative Report:  Date of service: 3/1/2024    Operation/Procedures:  1. Bilateral endoscopic total ethmoidectomies with sphenoidotomies with removal of tissue from sinuses  2. Bilateral endoscopic frontal sinusotomies with frontal sinus explorations - modifier 22  3. Bilateral endoscopic maxillary antrostomies with removal of tissue from sinuses  4. Septoplasty  5. Bilateral inferior turbinate submucous resection  6. Image guidance navigation - extradural     Surgeon: Donnie Eden MD FACS  Assistant: Art Greco DO  EBL: 200 ml  Anesthesia: General and local    Operative  Findings:  1. Chronic inflammation through all sinuses, with nasal sinus polyps  2. Absorbable hemostatic material in the ethmoids  3. Barba Splint sutured to the septum bilaterally  4. Propel Contour stents placed in the frontal sinusotomies bilaterally  5. Naturally narrow frontal sinus outflow tracts with extensive polyposis and bleeding resulting in a complex procedure modifier 22.    Per initial note 1/4/2024:  Son Rouse is a 53 y.o. male, referred by my highly esteemed colleague Dr. Alexandro Nicolas MD. The patient presents with concerns of sinus and nose problems that began at least 1 year ago but worsened approximately 3 months ago. The patient describes his sinus/nose symptoms as #1 nasal obstruction, #2 posterior nasal drainage, #3 anosmia for about 3 months. He was able to walk up to 3 miles/day a couple years ago. This has gradually reduced to 2 miles/day and now to only 1 mile/day as of last year. You endorses yellow nasal drainage in the morning. Patient denies facial pressure, rhinorrhea, facial pain, periorbital edema, epiphora, loss of taste, loss of smell, and epistaxis. The patient has taken fluticasone for greater than 3 months and azelastine to alleviate the problem. He has a history of allergic rhinitis or allergies. In 2018, he tested positive to ragweed, mold spores, oak. He denies any diagnosis of nasal polyps or chronic sinusitis by the allergist in 2018. He is allergic to NSAIDs and has a history of AERD. He takes Symbicort for asthma.    History of prior nasal/sinus surgery or procedure: Denies  History of Prediabetes  Allergies: Nsaids (non-steroidal anti-inflammatory drug)    Assessment   Son Rouse is a 54 y.o. male h/o chronic pansinusitis w NP, right DNS, ITH, nasal obstruction s/p bilateral ESS (total), septoplasty, bilateral ITR. The patient had surgery 3/1/2024.  3/7/24 - 1st post op. Expected post op swelling today. Debridement performed as noted. Rx budesonide  irrigations BID x 3 months. Continue saline irrigations.  3/21/24 - 2nd post op. Doing well. Breathing well. Some smell has come back. Frontals patent today, naturally narrow. Debridement performed as noted. Continue budesonide irrigations BID. Rec nasal moisturization.  5/16/24 - Sinuses PRISTINE, except for edema of the left maxillary sinus. Debridement performed. Continue budesonide irrigations BID.  7/18/24 - Left sinuses PRISTINE. Right side with low grade infection crusting and edema in ethmoids. Rx doxy x 10 days. Rx Medrol Dosepak. Longer term would like to start Rx Xhance. Increase budseonide irrig to BID until finished.  9/27/24 - Increased sx pressure and drainage. Significant moderate edema in ethmoids and frontals. Rx Medrol dose zaid. Start Xhance. Continue Flonase until receiving Xhance. Continue daily saline irrig.  1/24/25 - No infection but stable polypoid edema in frontal recess. Xhance sample provided, Start flunisolide after finishing sample. Has already failed greater than 3 months of budesonide irrigations and fluticasone.  3/28/25 - Left-sided sinus infection, Rx doxy x 10 days. Polyps unchanged after completing 5 weeks of flunisolide, which he ran out of a few weeks ago. Rx Xhance. If insurance refuses to cover, will plan to re-prescribe flunisolide, but for nasal polyps he has failed 3 different topical intranasal steroid treatments (flunisolide, fluticasone, budesonide) that were given for greater than 4 weeks each.    Plan   Nasal endoscopy. Findings: as noted.  Patient was prescribed a 10-day course of doxycycline 100 mg BID. Patient was advised to take the antibiotic after meals, avoid sun exposure or apply strong sunscreen (SPF-50 or higher) when in the sun, avoid dairy/calcium/iron within 2 hours of taking the antibiotic, and to consume a daily yogurt or a probiotic in the middle of the day.  Patient was prescribed Xhance, 1 puff in each nostril BID. If his insurance refuses to cover  it, I will plan to re-prescribe flunisolide, though patient has already shown this does not improve his polyps.  Increase saline irrigations to twice daily due to sinus infection today. In the future, if he develops thick yellow green drainage, facial pressure, or loss of smell/taste, he was instructed to start doing saline irrigations twice daily. If he continues to have thick yellow green drainage, facial pressure, or loss of smell/taste for more than 5-6 days, he was instructed to send our office a [employer] message or call our office.  Patient may continue nasal moisturization with antibiotic ointment, saline gel, or Vaseline. Patient was instructed to swipe a pea-sized amount into anterior nares and sniff back. Patient was instructed to avoid applying it with Q-tips and use it after any nasal sprays or irrigations.  Follow up with me in 3 months.    Donnie Eden MD [facility]  Division of Rhinology, Sinus, and Skull Base Surgery       Exam   General: This is a healthy appearing male who appears his stated age. The patient is alert and appropriately verbally conversant without hoarseness.  Face: The face was inspected and no cutaneous masses or lesions were visualized. There was no erythema or edema noted. Facial movement was symmetric without weakness. No skin lesions were detected.  Eyes: Extra-ocular muscle function was intact. No nystagmus was observed. Pupils were equal.    Nose: Examination of the nose revealed the nasal dorsum to be midline. Intranasal exam reveals the septum is healthy without perforation. The inferior turbinates were expectedly edematous. Crusts and dried secretions noted on anterior rhinoscopy. See below procedure note as applicable for further exam.    Procedure Note:  Procedure: Nasal endoscopy - diagnostic - bilateral  Indication: Chronic rhinosinusitis, post operative from sinus surgery  Informed consent obtained: risks, benefits, alternatives, and expectations discussed with patient and  the patient wishes to proceed.    Findings: After anesthesia and decongestion with topical lidocaine and Afrin spray, the nasal cavities were examined with a zero and/or 30-degree endoscope. Nasal cavity is clear bilaterally. Sphenoidotomies are clear bilaterally. Anterior and posterior ethmoid sinuses are with moderate edema and frontal recess with recurrent polyps bilaterally. Maxillary sinuses patent with mild edema.- Left maxillary sinus with yellow crusting and drainage. The frontal sinusotomies not visible. There is no CSF leak. Otherwise the inferior, middle, superior turbinates and meatuses, sphenoethmoid recess, nasopharynx, nasal cavity and septum were all normal.The patient tolerated the procedure well and there were no complications.       Scribe Attestation  By signing my name below, I, Lopez Valencia, attest that this documentation has been prepared under the direction and in the presence of Donnie Eden MD.

## 2025-03-27 NOTE — PATIENT INSTRUCTIONS
PATIENT INSTRUCTIONS ARE COMPLETE and READY TO PRINT    Doxycycline:  I prescribed a 10-day course of doxycycline. Take it twice a day after breakfast and supper. In the middle of the day, take a daily probiotic or yogurt.  Doxycycline can cause a skin sensitivity reaction with the sun. Avoid sun exposure while taking this medication or apply strong sunscreen (SPF-50 or higher) when in the sun. Please do not consume any dairy, calcium, or iron for 2 hours before or after taking doxycycline because this inactivates doxycycline. Common side effects from doxycycline include stomach upset and diarrhea. Take the antibiotic after meals to avoid these side effects. If you develop abdominal pain, skin rash, or diarrhea, please contact our office at [phone].    Saline Irrigations:  Please increase saline irrigations twice daily.  To prepare a saline irrigation, add 1 salt/baking soda packet to 8 ounces of distilled water, or use the recipe below to make your own saline solution. Shake to dissolve. Use half of the solution (4 ounces or 120 mL) in each nostril.  When you decide to do an irrigation, please irrigate first before using Xhance or flunisolide. Otherwise, you will wash the nasal spray out of your nose with the irrigation.     Recipe to Make your Own Nasal Saline Solution:  Mix 8 ounces of distilled water or tap water (be sure to boil tap water, then let it cool down) with 1/2 teaspoon of baking soda and 1/2 teaspoon of table salt and shake bottle to dissolve.    Xhance:  We will try to get you on Xhance. Do 1 puff of Xhance in each nostril twice a day. To watch an Xhance demonstration video, please visit [email or web address]  If your insurance refuses to cover Xhance, I will likely re-prescribe flunisolide. Do 2 sprays of flunisolide in each nostril twice daily.  When you decide to do an irrigation, please irrigate first before using Xhance or flunisolide. Otherwise, you will wash the nasal spray out of your nose  with the irrigation.     Nasal moisturization:  If desired, you may use antibiotic ointment, nasal saline gel (such as Ayr saline gel), or Vaseline in your nose. Use it at the end after nasal irrigations and nasal sprays (flunisolide or Xhance).  If you get it in a tube, apply a pea-sized amount to the pad of your finger. Then, swipe it into the front of your nostril and sniff back gently. Repeat this in the opposite nostril.  If you get saline gel in a spray bottle, lift the outside of your nostril and spray directly at the center of your nose. After spraying, gently sniff back and pinch your nose.  Please do not use a Q-tip, fingernail, or rub your finger in your nose because this causes irritation.  Moisturizing your nose will prevent nasal dryness and is especially beneficial during the winter season.     Sinus infection:  In the future, if you develop copious thick yellow green drainage, facial pressure, or loss of smell/taste, please start doing saline irrigations twice daily for about 5 days. This can sometimes prevent a full-blown sinus infection from developing. If you continue to have thick yellow green drainage, facial pressure, or loss of smell/taste for more than 5-6 days, please send us a [employer] message or contact our office at [phone].     Follow up:  Please follow up with me in 3 months for reevaluation. Feel free to contact my office at [phone] with any questions.    Scribe Attestation  By signing my name below, I, Lopez Valencia, attest that this documentation has been prepared under the direction and in the presence of Donnie Eden MD.

## 2025-03-28 ENCOUNTER — APPOINTMENT (OUTPATIENT)
Dept: OTOLARYNGOLOGY | Facility: CLINIC | Age: 55
End: 2025-03-28
Payer: COMMERCIAL

## 2025-03-28 VITALS — BODY MASS INDEX: 37.65 KG/M2 | HEIGHT: 70 IN | WEIGHT: 263 LBS

## 2025-03-28 DIAGNOSIS — J32.4 CHRONIC PANSINUSITIS: ICD-10-CM

## 2025-03-28 DIAGNOSIS — J33.8 POLYP OF NASAL SINUS: ICD-10-CM

## 2025-03-28 DIAGNOSIS — J32.4 CHRONIC PANSINUSITIS: Primary | ICD-10-CM

## 2025-03-28 PROCEDURE — 1036F TOBACCO NON-USER: CPT | Performed by: OTOLARYNGOLOGY

## 2025-03-28 PROCEDURE — 99214 OFFICE O/P EST MOD 30 MIN: CPT | Performed by: OTOLARYNGOLOGY

## 2025-03-28 PROCEDURE — 31231 NASAL ENDOSCOPY DX: CPT | Performed by: OTOLARYNGOLOGY

## 2025-03-28 PROCEDURE — 3008F BODY MASS INDEX DOCD: CPT | Performed by: OTOLARYNGOLOGY

## 2025-03-28 RX ORDER — DOXYCYCLINE 100 MG/1
100 TABLET ORAL 2 TIMES DAILY
Qty: 20 TABLET | Refills: 0 | Status: SHIPPED | OUTPATIENT
Start: 2025-03-28 | End: 2025-04-07

## 2025-03-28 RX ORDER — FLUTICASONE PROPIONATE 93 UG/1
SPRAY, METERED NASAL
Qty: 16 ML | Refills: 11 | Status: SHIPPED | OUTPATIENT
Start: 2025-03-28

## 2025-06-18 NOTE — PROGRESS NOTES
Sinus & Skull Base Surgery    HPI   5/16/24 - Patient presents for followup. He reports doing well. He is breathing well through the nose and is starting to smell things again. He does get occasional hardened mucous that sometimes wants to go down his throat. He is performing budesonide irrigations twice daily.  7/18/24 - Patient presents for a followup visit. He reports doing well with the sinuses/nose. Approximately every 4 days, he reports a large crust on the left side. He is performing budesonide irrigations once daily.  9/27/24 - Patient presents for a followup visit. He reports temporary improvement with doxy x 10 days and the Medrol Dosepak. He completed the budesonide irrigations and switched to using Flonase. At that time, he stopped the saline irrigations and his symptoms worsened. So, he restarted saline irrigations once daily.  1/24/25 - Patient presents for a followup visit. He reports mucous occasionally that clears with a rinse. He notes great sense of smell. His PND resolved. He did not obtain Xhance. He also did not start the oral steroid. He is using Flonase at least once daily and often twice daily.  3/28/25 - Patient presents for a followup visit. He completed the trial of flunisolide for 5 weeks and reports limited benefit. For the last 3 weeks, he has discolored crusting. July 2024 was his last course of antibiotics.   6/19/25 - Patient presents for a followup visit. He reports doing well with the sinuses/nose. He reports slight congestion over the last 2 days. Xhance was likely approved by his insurance. He has been using Xhance approx once or twice daily since mid-April. He sometimes forgets to use Xhance.    Operative Report:  Date of service: 3/1/2024    Operation/Procedures:  1. Bilateral endoscopic total ethmoidectomies with sphenoidotomies with removal of tissue from sinuses  2. Bilateral endoscopic frontal sinusotomies with frontal sinus explorations - modifier 22  3. Bilateral  endoscopic maxillary antrostomies with removal of tissue from sinuses  4. Septoplasty  5. Bilateral inferior turbinate submucous resection  6. Image guidance navigation - extradural     Surgeon: Donnie Eden MD FACS  Assistant: Art Greco DO  EBL: 200 ml  Anesthesia: General and local    Operative Findings:  1. Chronic inflammation through all sinuses, with nasal sinus polyps  2. Absorbable hemostatic material in the ethmoids  3. Barba Splint sutured to the septum bilaterally  4. Propel Contour stents placed in the frontal sinusotomies bilaterally  5. Naturally narrow frontal sinus outflow tracts with extensive polyposis and bleeding resulting in a complex procedure modifier 22.    Per initial note 1/4/2024:  Son Rouse is a 53 y.o. male, referred by my highly esteemed colleague Dr. Alexandro Nicolas MD. The patient presents with concerns of sinus and nose problems that began at least 1 year ago but worsened approximately 3 months ago. The patient describes his sinus/nose symptoms as #1 nasal obstruction, #2 posterior nasal drainage, #3 anosmia for about 3 months. He was able to walk up to 3 miles/day a couple years ago. This has gradually reduced to 2 miles/day and now to only 1 mile/day as of last year. You endorses yellow nasal drainage in the morning. Patient denies facial pressure, rhinorrhea, facial pain, periorbital edema, epiphora, loss of taste, loss of smell, and epistaxis. The patient has taken fluticasone for greater than 3 months and azelastine to alleviate the problem. He has a history of allergic rhinitis or allergies. In 2018, he tested positive to ragweed, mold spores, oak. He denies any diagnosis of nasal polyps or chronic sinusitis by the allergist in 2018. He is allergic to NSAIDs and has a history of AERD. He takes Symbicort for asthma.    History of prior nasal/sinus surgery or procedure: Denies  History of Prediabetes  Allergies: Nsaids (non-steroidal anti-inflammatory drug)    Assessment    Son Rouse is a 54 y.o. male h/o chronic pansinusitis w NP, right DNS, ITH, nasal obstruction s/p bilateral ESS (total), septoplasty, bilateral ITR. The patient had surgery 3/1/2024.  3/7/24 - 1st post op. Expected post op swelling today. Debridement performed as noted. Rx budesonide irrigations BID x 3 months. Continue saline irrigations.  3/21/24 - 2nd post op. Doing well. Breathing well. Some smell has come back. Frontals patent today, naturally narrow. Debridement performed as noted. Continue budesonide irrigations BID. Rec nasal moisturization.  5/16/24 - Sinuses PRISTINE, except for edema of the left maxillary sinus. Debridement performed. Continue budesonide irrigations BID.  7/18/24 - Left sinuses PRISTINE. Right side with low grade infection crusting and edema in ethmoids. Rx doxy x 10 days. Rx Medrol Dosepak. Longer term would like to start Rx Xhance. Increase budseonide irrig to BID until finished.  9/27/24 - Increased sx pressure and drainage. Significant moderate edema in ethmoids and frontals. Rx Medrol dose zaid. Start Xhance. Continue Flonase until receiving Xhance. Continue daily saline irrig.  1/24/25 - No infection but stable polypoid edema in frontal recess. Xhance sample provided, Start flunisolide after finishing sample. Has already failed greater than 3 months of budesonide irrigations and fluticasone.  3/28/25 - Left-sided sinus infection, Rx doxy x 10 days. Polyps unchanged after completing 5 weeks of flunisolide, which he ran out of a few weeks ago. Rx Xhance. If insurance refuses to cover, will plan to re-prescribe flunisolide, but for nasal polyps he has failed 3 different topical intranasal steroid treatments (flunisolide, fluticasone, budesonide) that were given for greater than 4 weeks each.  6/19/25 - Feeling well overall. Persisting edema without infection but improved with visibility of frontal sinusotomies and less posterior ethmoid edema. Overall improved compared to  1/24/25. Continue Xhance. Advised to double dose on next dose if he misses a dose    Plan   Nasal endoscopy. Findings: as noted.  Continue Xhance, 1 puff in each nostril BID. If he misses a dose, he may use 2 puffs in each nostril once daily.  Continue saline irrigations as needed. In the future, if he develops thick yellow green drainage, facial pressure, or loss of smell/taste, he was instructed to start doing saline irrigations twice daily. If he continues to have thick yellow green drainage, facial pressure, or loss of smell/taste for more than 4-5 days, he was instructed to send our office a [employer] message or call our office.  Patient may continue nasal moisturization with antibiotic ointment, saline gel, or Vaseline. Patient was instructed to swipe a pea-sized amount into anterior nares and sniff back. Patient was instructed to avoid applying it with Q-tips and use it after any nasal sprays or irrigations.  Follow up with me in 6 months    Donnie Eden MD [facility]  Division of Rhinology, Sinus, and Skull Base Surgery       Exam   General: This is a healthy appearing male who appears his stated age. The patient is alert and appropriately verbally conversant without hoarseness.  Face: The face was inspected and no cutaneous masses or lesions were visualized. There was no erythema or edema noted. Facial movement was symmetric without weakness. No skin lesions were detected.  Eyes: Extra-ocular muscle function was intact. No nystagmus was observed. Pupils were equal.    Nose: Examination of the nose revealed the nasal dorsum to be midline. Intranasal exam reveals the septum is healthy without perforation. The inferior turbinates were expectedly edematous. Crusts and dried secretions noted on anterior rhinoscopy. See below procedure note as applicable for further exam.    Procedure Note:  Procedure: Nasal endoscopy - diagnostic - bilateral  Indication: Chronic rhinosinusitis, post operative from sinus  surgery  Informed consent obtained: risks, benefits, alternatives, and expectations discussed with patient and the patient wishes to proceed.    Findings: After anesthesia and decongestion with topical lidocaine and Afrin spray, the nasal cavities were examined with a zero and/or 30-degree endoscope. Nasal cavity is clear bilaterally. Sphenoidotomies are clear bilaterally. Today posterior ethmoid sinuses are clear, and anteriorly with moderate edema and frontal recess. Polypoid swellings are improved from prior but still exist at a smaller level. Maxillary sinuses patent bilaterally. The frontal sinusotomies visible bilaterally, but with edema - this is improved as well. There is no CSF leak. Otherwise the inferior, middle, superior turbinates and meatuses, sphenoethmoid recess, nasopharynx, nasal cavity and septum were all normal.The patient tolerated the procedure well and there were no complications.       Scribe Attestation  By signing my name below, I, Lopez Valencia, attest that this documentation has been prepared under the direction and in the presence of Donnie Eden MD.

## 2025-06-18 NOTE — PATIENT INSTRUCTIONS
PATIENT INSTRUCTIONS ARE COMPLETE and READY TO PRINT    Saline Irrigations:  Please continue saline irrigations as needed.  To prepare a saline irrigation: Always add 1 salt/baking soda packet to 8 ounces of distilled water, or use the saline recipe provided below. Shake to dissolve. Use half of the solution (4 ounces or 120 mL) in each nostril.  When you decide to do an irrigation, please irrigate first before using Xhance. Otherwise, you will wash the nasal spray out of your nose with the irrigation.     Saline recipe:  Use distilled water. Prepare 8 ounces (or 240 mL) of distilled water with 1/2 teaspoon of baking soda and 1/2 teaspoon of table salt. Shake bottle to dissolve. Avoid using tap water. If you must use tap water, be sure to boil it, then let it cool down.    Xhance:  We will try to get you on Xhance. Do 1 puff of Xhance in each nostril twice a day. If you miss a dose, you can double the dose to 2 puffs in each nostril once daily. To watch an Xhance demonstration video, please visit [email or web address]  When you decide to do an irrigation, please irrigate first before using Xhance. Otherwise, you will wash the nasal spray out of your nose with the irrigation.     Nasal moisturization:  If desired, you may use antibiotic ointment, nasal saline gel (such as Ayr saline gel), or Vaseline in your nose. Use it at the end after nasal irrigations and nasal sprays (Xhance).  If you get it in a tube, apply a pea-sized amount to the pad of your finger. Then, swipe it into the front of your nostril and sniff back gently. Repeat this in the opposite nostril.  If you get saline gel in a spray bottle, lift the outside of your nostril and spray directly at the center of your nose. After spraying, gently sniff back and pinch your nose.  Please do not use a Q-tip, fingernail, or rub your finger in your nose because this causes irritation.  Moisturizing your nose will prevent nasal dryness and is especially beneficial during  the winter season.     Sinus infection:  In the future, if you develop copious thick yellow green drainage, facial pressure, or loss of smell/taste, please start doing saline irrigations twice daily for about 5 days. This can sometimes prevent a full-blown sinus infection from developing. If you continue to have thick yellow green drainage, facial pressure, or loss of smell/taste for more than 4-5 days, please send us a [employer] message or contact our office at [phone].    Follow up:  Please follow up with me in 6 months. If you have questions, feel free to contact my office at [phone] or send us a [employer] message.    Scribe Attestation  By signing my name below, I, Lopez Valencia, attest that this documentation has been prepared under the direction and in the presence of Donnie Eden MD.

## 2025-06-19 ENCOUNTER — APPOINTMENT (OUTPATIENT)
Dept: OTOLARYNGOLOGY | Facility: CLINIC | Age: 55
End: 2025-06-19
Payer: COMMERCIAL

## 2025-06-19 VITALS — TEMPERATURE: 97.5 F | BODY MASS INDEX: 37.74 KG/M2 | HEIGHT: 70 IN

## 2025-06-19 DIAGNOSIS — J32.4 CHRONIC PANSINUSITIS: Primary | ICD-10-CM

## 2025-06-19 DIAGNOSIS — J33.8 POLYP OF NASAL SINUS: ICD-10-CM

## 2025-06-19 PROCEDURE — 99213 OFFICE O/P EST LOW 20 MIN: CPT | Performed by: OTOLARYNGOLOGY

## 2025-06-19 PROCEDURE — 31231 NASAL ENDOSCOPY DX: CPT | Performed by: OTOLARYNGOLOGY

## 2025-06-19 PROCEDURE — 1036F TOBACCO NON-USER: CPT | Performed by: OTOLARYNGOLOGY

## 2025-06-19 ASSESSMENT — PATIENT HEALTH QUESTIONNAIRE - PHQ9
SUM OF ALL RESPONSES TO PHQ9 QUESTIONS 1 & 2: 0
2. FEELING DOWN, DEPRESSED OR HOPELESS: NOT AT ALL
1. LITTLE INTEREST OR PLEASURE IN DOING THINGS: NOT AT ALL

## 2025-06-19 ASSESSMENT — PAIN SCALES - GENERAL: PAINLEVEL_OUTOF10: 0-NO PAIN

## 2025-09-04 ENCOUNTER — TELEPHONE (OUTPATIENT)
Dept: OTOLARYNGOLOGY | Facility: HOSPITAL | Age: 55
End: 2025-09-04
Payer: COMMERCIAL

## 2025-12-04 ENCOUNTER — APPOINTMENT (OUTPATIENT)
Dept: OTOLARYNGOLOGY | Facility: CLINIC | Age: 55
End: 2025-12-04
Payer: COMMERCIAL

## (undated) DEVICE — GLOVE, SURGICAL, PROTEXIS PI , 8.0, PF, LF

## (undated) DEVICE — TRACKER, PATIENT

## (undated) DEVICE — TOWEL, SURGICAL, NEURO, O/R, 16 X 26, BLUE, STERILE

## (undated) DEVICE — BLADE, 60 DEGREE, 2.9MM, MICRODEBRIDER

## (undated) DEVICE — COVER HANDLE LIGHT, STERIS, BLUE, STERILE

## (undated) DEVICE — TUBE, SALEM SUMP, 16 FR X 48IN, ENFIT

## (undated) DEVICE — DRAPE, INSTRUMENT, W/POUCH, STERI DRAPE, 7 X 11 IN, DISPOSABLE, STERILE

## (undated) DEVICE — Device

## (undated) DEVICE — TRAP, SPECIMEN, NEPTUNE QUICK COLLECTOR

## (undated) DEVICE — DRAPE, FLUID WARMER

## (undated) DEVICE — HOLSTER, JET SAFETY

## (undated) DEVICE — TRACKER, INSTRUMENT